# Patient Record
Sex: MALE | Race: WHITE | NOT HISPANIC OR LATINO | URBAN - METROPOLITAN AREA
[De-identification: names, ages, dates, MRNs, and addresses within clinical notes are randomized per-mention and may not be internally consistent; named-entity substitution may affect disease eponyms.]

---

## 2023-05-22 DIAGNOSIS — I10 ESSENTIAL (PRIMARY) HYPERTENSION: ICD-10-CM

## 2023-05-22 RX ORDER — HYDROCHLOROTHIAZIDE 12.5 MG/1
TABLET ORAL
Qty: 90 TABLET | Refills: 2 | Status: SHIPPED | OUTPATIENT
Start: 2023-05-22 | End: 2024-03-07

## 2023-07-21 DIAGNOSIS — N52.9 ERECTILE DYSFUNCTION, UNSPECIFIED ERECTILE DYSFUNCTION TYPE: Primary | ICD-10-CM

## 2023-07-21 RX ORDER — TADALAFIL 10 MG/1
10 TABLET ORAL DAILY PRN
COMMUNITY
Start: 2012-11-27 | End: 2023-07-21 | Stop reason: SDUPTHER

## 2023-07-21 RX ORDER — TADALAFIL 10 MG/1
10 TABLET ORAL DAILY PRN
Qty: 10 TABLET | Refills: 0 | Status: SHIPPED | OUTPATIENT
Start: 2023-07-21 | End: 2023-11-20

## 2023-09-27 DIAGNOSIS — E78.5 HYPERLIPIDEMIA, UNSPECIFIED: ICD-10-CM

## 2023-09-27 RX ORDER — ATORVASTATIN CALCIUM 40 MG/1
40 TABLET, FILM COATED ORAL NIGHTLY
Qty: 90 TABLET | Refills: 2 | Status: SHIPPED | OUTPATIENT
Start: 2023-09-27 | End: 2024-04-24

## 2023-10-13 ENCOUNTER — SPECIALTY PHARMACY (OUTPATIENT)
Dept: PHARMACY | Facility: CLINIC | Age: 68
End: 2023-10-13

## 2023-10-13 DIAGNOSIS — D45 POLYCYTHEMIA VERA (MULTI): Primary | ICD-10-CM

## 2023-10-13 RX ORDER — HYDROXYUREA 500 MG/1
CAPSULE ORAL
Qty: 42 CAPSULE | Refills: 2 | Status: SHIPPED | OUTPATIENT
Start: 2023-10-13 | End: 2024-01-21 | Stop reason: SDUPTHER

## 2023-10-14 ENCOUNTER — PHARMACY VISIT (OUTPATIENT)
Dept: PHARMACY | Facility: CLINIC | Age: 68
End: 2023-10-14
Payer: MEDICARE

## 2023-10-14 PROCEDURE — RXMED WILLOW AMBULATORY MEDICATION CHARGE

## 2023-10-14 NOTE — PROGRESS NOTES
Adriel Solis is a 68 y.o. year old male patient who had a patient care encounter with Gladis Martell PA-C on 7/14/23 for ongoing management of Polycythemia vera (CMS/HCC) [D45].  Adriel is currently being treated with hydroxyurea.    Adriel is active with good appetite.  Labs from 7/14/23:  WBC 9.9, ANC 4.15, H/H 14.8/41.5, PLTs 286;  CMP stable.  Continue current therapy.    Per collaborative practice agreement with Dr. Linda, on 10/13/23  I refilled hydroxyurea 1000 mg (2 x 500 mg) PO once daily on Mon, Weds and Fri and 500 mg PO once daily on the other 4 days a week, 30 day cycle.  Qty # 42 with 2 refills.  The prescription was sent to Dr. Linda for approval, pending subsequent electronic transmission to  [facility].    Next FUV is 11/14/23.    Jacob Blackwell, [facility], MS, BCOP  Clinical Pharmacist Specialist - Ambulatory Oncology

## 2023-11-12 ASSESSMENT — PATIENT HEALTH QUESTIONNAIRE - PHQ9
1. LITTLE INTEREST OR PLEASURE IN DOING THINGS: NOT AT ALL
4. FEELING TIRED OR HAVING LITTLE ENERGY: SEVERAL DAYS
9. THOUGHTS THAT YOU WOULD BE BETTER OFF DEAD, OR OF HURTING YOURSELF: NOT AT ALL
6. FEELING BAD ABOUT YOURSELF - OR THAT YOU ARE A FAILURE OR HAVE LET YOURSELF OR YOUR FAMILY DOWN: NOT AT ALL
5. POOR APPETITE OR OVEREATING: NOT AT ALL
5. POOR APPETITE OR OVEREATING: 0
7. TROUBLE CONCENTRATING ON THINGS, SUCH AS READING THE NEWSPAPER OR WATCHING TELEVISION: 0
4. FEELING TIRED OR HAVING LITTLE ENERGY: 1
8. MOVING OR SPEAKING SO SLOWLY THAT OTHER PEOPLE COULD HAVE NOTICED. OR THE OPPOSITE, BEING SO FIGETY OR RESTLESS THAT YOU HAVE BEEN MOVING AROUND A LOT MORE THAN USUAL: NOT AT ALL
1. LITTLE INTEREST OR PLEASURE IN DOING THINGS: 0
2. FEELING DOWN, DEPRESSED OR HOPELESS: SEVERAL DAYS
5. POOR APPETITE OR OVEREATING: NOT AT ALL
8. MOVING OR SPEAKING SO SLOWLY THAT OTHER PEOPLE COULD HAVE NOTICED. OR THE OPPOSITE, BEING SO FIGETY OR RESTLESS THAT YOU HAVE BEEN MOVING AROUND A LOT MORE THAN USUAL: NOT AT ALL
1. LITTLE INTEREST OR PLEASURE IN DOING THINGS: NOT AT ALL
6. FEELING BAD ABOUT YOURSELF - OR THAT YOU ARE A FAILURE OR HAVE LET YOURSELF OR YOUR FAMILY DOWN: 0
2. FEELING DOWN, DEPRESSED, IRRITABLE, OR HOPELESS: 1
10. IF YOU CHECKED OFF ANY PROBLEMS, HOW DIFFICULT HAVE THESE PROBLEMS MADE IT FOR YOU TO DO YOUR WORK, TAKE CARE OF THINGS AT HOME, OR GET ALONG WITH OTHER PEOPLE: SOMEWHAT DIFFICULT
3. TROUBLE FALLING OR STAYING ASLEEP OR SLEEPING TOO MUCH: SEVERAL DAYS
6. FEELING BAD ABOUT YOURSELF - OR THAT YOU ARE A FAILURE OR HAVE LET YOURSELF OR YOUR FAMILY DOWN: NOT AT ALL
8. MOVING OR SPEAKING SO SLOWLY THAT OTHER PEOPLE COULD HAVE NOTICED. OR THE OPPOSITE, BEING SO FIGETY OR RESTLESS THAT YOU HAVE BEEN MOVING AROUND A LOT MORE THAN USUAL: 0
3. TROUBLE FALLING OR STAYING ASLEEP OR SLEEPING TOO MUCH: 1
SUM OF ALL RESPONSES TO PHQ QUESTIONS 1-9: 3
2. FEELING DOWN, DEPRESSED, IRRITABLE, OR HOPELESS: SEVERAL DAYS
7. TROUBLE CONCENTRATING ON THINGS, SUCH AS READING THE NEWSPAPER OR WATCHING TELEVISION: NOT AT ALL
3. TROUBLE FALLING OR STAYING ASLEEP OR SLEEPING TOO MUCH: SEVERAL DAYS
9. THOUGHTS THAT YOU WOULD BE BETTER OFF DEAD, OR OF HURTING YOURSELF: NOT AT ALL
7. TROUBLE CONCENTRATING ON THINGS, SUCH AS READING THE NEWSPAPER OR WATCHING TELEVISION: NOT AT ALL
4. FEELING TIRED OR HAVING LITTLE ENERGY: SEVERAL DAYS
9. THOUGHTS THAT YOU WOULD BE BETTER OFF DEAD, OR OF HURTING YOURSELF: 0
10. IF YOU CHECKED OFF ANY PROBLEMS, HOW DIFFICULT HAVE THESE PROBLEMS MADE IT FOR YOU TO DO YOUR WORK, TAKE CARE OF THINGS AT HOME, OR GET ALONG WITH OTHER PEOPLE: SOMEWHAT DIFFICULT
SUM OF ALL RESPONSES TO PHQ QUESTIONS 1-9: 3

## 2023-11-14 ENCOUNTER — OFFICE VISIT (OUTPATIENT)
Dept: HEMATOLOGY/ONCOLOGY | Facility: CLINIC | Age: 68
End: 2023-11-14
Payer: COMMERCIAL

## 2023-11-14 ENCOUNTER — LAB (OUTPATIENT)
Dept: LAB | Facility: CLINIC | Age: 68
End: 2023-11-14
Payer: COMMERCIAL

## 2023-11-14 VITALS
WEIGHT: 209.66 LBS | SYSTOLIC BLOOD PRESSURE: 123 MMHG | TEMPERATURE: 97 F | BODY MASS INDEX: 30.01 KG/M2 | RESPIRATION RATE: 16 BRPM | DIASTOLIC BLOOD PRESSURE: 79 MMHG | OXYGEN SATURATION: 95 % | HEART RATE: 87 BPM

## 2023-11-14 DIAGNOSIS — D45 POLYCYTHEMIA VERA (MULTI): ICD-10-CM

## 2023-11-14 DIAGNOSIS — D47.3 ESSENTIAL THROMBOCYTHEMIA (MULTI): Primary | ICD-10-CM

## 2023-11-14 LAB
ALBUMIN SERPL BCP-MCNC: 4.3 G/DL (ref 3.4–5)
ALP SERPL-CCNC: 77 U/L (ref 33–136)
ALT SERPL W P-5'-P-CCNC: 25 U/L (ref 10–52)
ANION GAP SERPL CALC-SCNC: 15 MMOL/L (ref 10–20)
AST SERPL W P-5'-P-CCNC: 19 U/L (ref 9–39)
BASOPHILS # BLD AUTO: 0.04 X10*3/UL (ref 0–0.1)
BASOPHILS NFR BLD AUTO: 0.4 %
BILIRUB SERPL-MCNC: 0.6 MG/DL (ref 0–1.2)
BUN SERPL-MCNC: 29 MG/DL (ref 6–23)
CALCIUM SERPL-MCNC: 9.8 MG/DL (ref 8.6–10.3)
CHLORIDE SERPL-SCNC: 103 MMOL/L (ref 98–107)
CO2 SERPL-SCNC: 27 MMOL/L (ref 21–32)
CREAT SERPL-MCNC: 1.12 MG/DL (ref 0.5–1.3)
EOSINOPHIL # BLD AUTO: 0.06 X10*3/UL (ref 0–0.7)
EOSINOPHIL NFR BLD AUTO: 0.5 %
ERYTHROCYTE [DISTWIDTH] IN BLOOD BY AUTOMATED COUNT: 13.6 % (ref 11.5–14.5)
GFR SERPL CREATININE-BSD FRML MDRD: 71 ML/MIN/1.73M*2
GLUCOSE SERPL-MCNC: 109 MG/DL (ref 74–99)
HCT VFR BLD AUTO: 43.8 % (ref 41–52)
HGB BLD-MCNC: 15.2 G/DL (ref 13.5–17.5)
HOLD SPECIMEN: NORMAL
IMM GRANULOCYTES # BLD AUTO: 0.04 X10*3/UL (ref 0–0.7)
IMM GRANULOCYTES NFR BLD AUTO: 0.4 % (ref 0–0.9)
LYMPHOCYTES # BLD AUTO: 4.75 X10*3/UL (ref 1.2–4.8)
LYMPHOCYTES NFR BLD AUTO: 43.1 %
MCH RBC QN AUTO: 36.5 PG (ref 26–34)
MCHC RBC AUTO-ENTMCNC: 34.7 G/DL (ref 32–36)
MCV RBC AUTO: 105 FL (ref 80–100)
MONOCYTES # BLD AUTO: 0.61 X10*3/UL (ref 0.1–1)
MONOCYTES NFR BLD AUTO: 5.5 %
NEUTROPHILS # BLD AUTO: 5.51 X10*3/UL (ref 1.2–7.7)
NEUTROPHILS NFR BLD AUTO: 50.1 %
PLATELET # BLD AUTO: 336 X10*3/UL (ref 150–450)
POTASSIUM SERPL-SCNC: 3.8 MMOL/L (ref 3.5–5.3)
PROT SERPL-MCNC: 6.7 G/DL (ref 6.4–8.2)
RBC # BLD AUTO: 4.16 X10*6/UL (ref 4.5–5.9)
SODIUM SERPL-SCNC: 141 MMOL/L (ref 136–145)
WBC # BLD AUTO: 11 X10*3/UL (ref 4.4–11.3)

## 2023-11-14 PROCEDURE — 99214 OFFICE O/P EST MOD 30 MIN: CPT | Performed by: PHYSICIAN ASSISTANT

## 2023-11-14 PROCEDURE — 36415 COLL VENOUS BLD VENIPUNCTURE: CPT

## 2023-11-14 PROCEDURE — 85025 COMPLETE CBC W/AUTO DIFF WBC: CPT

## 2023-11-14 PROCEDURE — 80053 COMPREHEN METABOLIC PANEL: CPT

## 2023-11-14 PROCEDURE — 1159F MED LIST DOCD IN RCRD: CPT | Performed by: PHYSICIAN ASSISTANT

## 2023-11-14 PROCEDURE — 1126F AMNT PAIN NOTED NONE PRSNT: CPT | Performed by: PHYSICIAN ASSISTANT

## 2023-11-14 ASSESSMENT — ENCOUNTER SYMPTOMS
DIFFICULTY URINATING: 0
DIARRHEA: 0
DIZZINESS: 0
WOUND: 0
ABDOMINAL DISTENTION: 0
DECREASED CONCENTRATION: 0
CARDIOVASCULAR NEGATIVE: 1
HEMATOLOGIC/LYMPHATIC NEGATIVE: 1
CONSTIPATION: 0
APPETITE CHANGE: 0
DIAPHORESIS: 0
ABDOMINAL PAIN: 0
VOMITING: 0
HEMATURIA: 0
FREQUENCY: 0
HEADACHES: 0
FATIGUE: 1
EYE PROBLEMS: 0
SCLERAL ICTERUS: 0
NAUSEA: 0
CHILLS: 0
NECK STIFFNESS: 0
SPEECH DIFFICULTY: 0
RESPIRATORY NEGATIVE: 1
NECK PAIN: 0
NUMBNESS: 0
CONFUSION: 0

## 2023-11-14 ASSESSMENT — PAIN SCALES - GENERAL: PAINLEVEL: 0-NO PAIN

## 2023-11-14 NOTE — PROGRESS NOTES
Patient ID: Adriel Solis is a 68 y.o. male.  Primary care physician: Dr. Wilder Jones    Interval History:   Referred for Erythrocytosis. Mr. Solis is here after he was incidentally found to have a high blood counts (platelets and white blood cells). He is a very active man, quit smoking 30 years ago and was never a heavy smoker. 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 Although this was never proven, he was told that he may have had a blood clot.    Workup:  Lab parameters showed increased platelet count and slightly elevated white count. GERTRUDE-2 positive in peripheral blood. Diagnosed with Essential thrombocytosis.     Follow up  Started Hydrea 500mg by mouth daily in August 2020. Increased to Hydrea alternating 1000mg with 500mg in Nov 2020.    -Currently,  on Hydrea 1000 mg 3 days per week (Monday, Wednesday and Friday) and 500 mg for the rest 4 days.     Subjective    -Complains of fatigue in the morning.   -Denies any other new health concerns.   -Continues on hydrea 1000 mg 3 days per week and 500 mg for the rest 4 days. States that he tolerates hydrea well.     Objective    BMI:   Body mass index is 30.01 kg/m².     Vitals:   Visit Vitals  /79   Pulse 87   Temp 36.1 °C (97 °F)   Resp 16   Wt 95.1 kg (209 lb 10.5 oz)   SpO2 95%   BMI 30.01 kg/m²   BSA 2.17 m²        Review of Systems   Constitutional:  Positive for fatigue. Negative for appetite change, chills and diaphoresis.   HENT:   Negative for lump/mass, mouth sores and nosebleeds.    Eyes:  Negative for eye problems and icterus.   Respiratory: Negative.     Cardiovascular: Negative.    Gastrointestinal:  Negative for abdominal distention, abdominal pain, constipation, diarrhea, nausea and vomiting.   Genitourinary:  Negative for bladder incontinence, difficulty urinating, frequency and hematuria.    Musculoskeletal:  Negative for gait problem, neck pain and neck stiffness.    Skin:  Negative for itching, rash and wound.   Neurological:  Negative for dizziness, gait problem, headaches, numbness and speech difficulty.   Hematological: Negative.    Psychiatric/Behavioral:  Negative for confusion and decreased concentration.         Physical Exam  Constitutional:       General: He is not in acute distress.     Appearance: Normal appearance.   HENT:      Head: Normocephalic and atraumatic.      Nose: Nose normal.      Mouth/Throat:      Mouth: Mucous membranes are moist.      Pharynx: Oropharynx is clear. No oropharyngeal exudate or posterior oropharyngeal erythema.   Eyes:      General: No scleral icterus.     Extraocular Movements: Extraocular movements intact.      Conjunctiva/sclera: Conjunctivae normal.   Cardiovascular:      Rate and Rhythm: Normal rate and regular rhythm.   Pulmonary:      Effort: Pulmonary effort is normal. No respiratory distress.      Breath sounds: Normal breath sounds. No wheezing.   Abdominal:      General: There is no distension.   Musculoskeletal:         General: No swelling, deformity or signs of injury. Normal range of motion.      Cervical back: Normal range of motion.   Skin:     General: Skin is warm and dry.      Coloration: Skin is not jaundiced.      Findings: No bruising, lesion or rash.   Neurological:      General: No focal deficit present.      Mental Status: He is alert and oriented to person, place, and time. Mental status is at baseline.         Labs:  Lab Results   Component Value Date    WBC 9.9 07/14/2023    NEUTROABS 4.15 07/14/2023    LYMPHSABS 4.92 (H) 07/14/2023    MONOSABS 0.66 07/14/2023    EOSABS 0.13 07/14/2023    BASOSABS 0.05 07/14/2023    RBC 4.04 (L) 07/14/2023     (H) 07/14/2023    MCHC 35.7 07/14/2023    HGB 14.8 07/14/2023    HCT 41.5 07/14/2023     07/14/2023     Lab Results   Component Value Date    RETICCTPCT 1.6 07/14/2022      Lab Results   Component Value Date    CREATININE 1.22 07/14/2023    BUN 28 (H)  "07/14/2023     07/14/2023    K 3.7 07/14/2023     07/14/2023    CO2 29 07/14/2023      Lab Results   Component Value Date    ALT 18 07/14/2023    AST 18 07/14/2023    ALKPHOS 71 07/14/2023    BILITOT 0.6 07/14/2023      Lab Results   Component Value Date    CRP 8.95 (A) 11/22/2022      No results found for: \"JON\"  Lab Results   Component Value Date     11/17/2022          Performance Status:  Symptomatic; fully ambulatory    Assessment/Plan      66 y/o man referred for incidental finding of high platelet count. GERTRUDE-2 positive. Started on Hydrea 500mg by mouth daily in August 2020. Increased to 2-1-2-1 schedule on Nov 11, 2020. Tolerating this regimen well.     1) GERTRUDE-2 mutation   -Currently, he is on Hydrea 1000 mg, p.o. 3 days per week and 500 mg, p.o. 4 days per week.   -He tolerates treatment well.   -Labs done today: WBC 11.0, ANC 5.51, Hgb 15.2, , platelets 336,000, creatinine 1.12  -WBC, ANC, Hgb, platelets counts are within the normal range.   -Advised patient to continue current regimen of hydrea without changes.   -I will see him back in 4 months to follow up with labs done a few days prior to the next vsiit.       2) Anxiety   - Taking Lexapro 5mg daily.   - Follows with Dr. Jones, his PCP    Problem List Items Addressed This Visit    None  Visit Diagnoses         Codes    Essential thrombocythemia (CMS/HCC)    -  Primary D47.3    Relevant Orders    Clinic Appointment Request Follow Up; REBEL ALVES; [facility]    CBC and Auto Differential    Comprehensive Metabolic Panel                 Rebel Alves PA-C          "

## 2023-11-14 NOTE — PATIENT INSTRUCTIONS
You will continue the current regimen of Hydrea without changes.     I will see you back in 4 months to follow up.

## 2023-11-15 ENCOUNTER — PHARMACY VISIT (OUTPATIENT)
Dept: PHARMACY | Facility: CLINIC | Age: 68
End: 2023-11-15
Payer: MEDICARE

## 2023-11-15 PROCEDURE — RXMED WILLOW AMBULATORY MEDICATION CHARGE

## 2023-11-17 ENCOUNTER — SPECIALTY PHARMACY (OUTPATIENT)
Dept: PHARMACY | Facility: CLINIC | Age: 68
End: 2023-11-17

## 2023-11-19 DIAGNOSIS — N52.9 ERECTILE DYSFUNCTION, UNSPECIFIED ERECTILE DYSFUNCTION TYPE: ICD-10-CM

## 2023-11-19 DIAGNOSIS — M19.90 ARTHRITIS: Primary | ICD-10-CM

## 2023-11-19 DIAGNOSIS — I10 ESSENTIAL (PRIMARY) HYPERTENSION: ICD-10-CM

## 2023-11-20 RX ORDER — MELOXICAM 15 MG/1
15 TABLET ORAL DAILY PRN
Qty: 30 TABLET | Refills: 3 | Status: SHIPPED | OUTPATIENT
Start: 2023-11-20 | End: 2024-03-18

## 2023-11-20 RX ORDER — LOSARTAN POTASSIUM 50 MG/1
50 TABLET ORAL DAILY
Qty: 90 TABLET | Refills: 2 | Status: SHIPPED | OUTPATIENT
Start: 2023-11-20 | End: 2024-06-05

## 2023-11-20 RX ORDER — TADALAFIL 10 MG/1
10 TABLET ORAL DAILY PRN
Qty: 6 TABLET | Refills: 1 | Status: SHIPPED | OUTPATIENT
Start: 2023-11-20

## 2023-12-19 ENCOUNTER — SPECIALTY PHARMACY (OUTPATIENT)
Dept: PHARMACY | Facility: CLINIC | Age: 68
End: 2023-12-19

## 2023-12-19 PROCEDURE — RXMED WILLOW AMBULATORY MEDICATION CHARGE

## 2023-12-20 ENCOUNTER — PHARMACY VISIT (OUTPATIENT)
Dept: PHARMACY | Facility: CLINIC | Age: 68
End: 2023-12-20
Payer: MEDICARE

## 2024-01-16 DIAGNOSIS — D45 POLYCYTHEMIA VERA (MULTI): ICD-10-CM

## 2024-01-16 RX ORDER — HYDROXYUREA 500 MG/1
CAPSULE ORAL
Qty: 42 CAPSULE | Refills: 2 | Status: CANCELLED | OUTPATIENT
Start: 2024-01-16 | End: 2025-01-15

## 2024-01-21 DIAGNOSIS — D45 POLYCYTHEMIA VERA (MULTI): ICD-10-CM

## 2024-01-21 RX ORDER — HYDROXYUREA 500 MG/1
CAPSULE ORAL
Qty: 42 CAPSULE | Refills: 3 | Status: SHIPPED | OUTPATIENT
Start: 2024-01-21 | End: 2024-04-19 | Stop reason: SDUPTHER

## 2024-01-21 NOTE — PROGRESS NOTES
Adriel Solis is a 69 y.o. year old male patient who had a patient care encounter with Gladis Martell PA-C on 11/14/23for ongoing management of his PV.  Adriel is currently being treated with hydroxyurea.    Tolerating HU well.  Labs from 11/14/23:  HCT 43.8, PLTs 336, WBC 11.0, ANC 5.51, Hgb 15.2; CMP relatively stable (BUN at 29).  Continue current therapy.    Per collaborative practice agreement with Dr. Linda, on 1/21/24 per home medication protocol, I refilled hydroxyurea 1000 mg (2 x 500 mg) PO once daily on M, W, F (3 days/week) and 500 mg PO once daily on Sun, T, Th, Sat (4 days/week), 30-day continuous cycle.  Qty # 42 with 3 refills.  The prescription was sent to Dr. Linda for approval, pending subsequent electronic transmission to  [facility]ty Pharm[facility].    Next FUV is 3/14/24.      Jacob Blackwell, [facility], MS, BCOP  Clinical Pharmacist Specialist - Ambulatory Oncology

## 2024-01-22 PROCEDURE — RXMED WILLOW AMBULATORY MEDICATION CHARGE

## 2024-01-24 ENCOUNTER — PHARMACY VISIT (OUTPATIENT)
Dept: PHARMACY | Facility: CLINIC | Age: 69
End: 2024-01-24
Payer: MEDICARE

## 2024-02-21 ENCOUNTER — SPECIALTY PHARMACY (OUTPATIENT)
Dept: PHARMACY | Facility: CLINIC | Age: 69
End: 2024-02-21

## 2024-02-21 ENCOUNTER — PHARMACY VISIT (OUTPATIENT)
Dept: PHARMACY | Facility: CLINIC | Age: 69
End: 2024-02-21
Payer: MEDICARE

## 2024-02-21 PROCEDURE — RXMED WILLOW AMBULATORY MEDICATION CHARGE

## 2024-03-07 DIAGNOSIS — I10 ESSENTIAL (PRIMARY) HYPERTENSION: ICD-10-CM

## 2024-03-07 RX ORDER — HYDROCHLOROTHIAZIDE 12.5 MG/1
TABLET ORAL
Qty: 90 TABLET | Refills: 2 | Status: SHIPPED | OUTPATIENT
Start: 2024-03-07

## 2024-03-11 ASSESSMENT — ENCOUNTER SYMPTOMS
NUMBNESS: 0
FATIGUE: 1
DECREASED CONCENTRATION: 0
EYE PROBLEMS: 0
HEADACHES: 0
NAUSEA: 0
CARDIOVASCULAR NEGATIVE: 1
APPETITE CHANGE: 0
SPEECH DIFFICULTY: 0
DIZZINESS: 0
ABDOMINAL DISTENTION: 0
HEMATURIA: 0
WOUND: 0
RESPIRATORY NEGATIVE: 1
VOMITING: 0
ABDOMINAL PAIN: 0
NECK STIFFNESS: 0
DIAPHORESIS: 0
NECK PAIN: 0
CONSTIPATION: 0
DIARRHEA: 0
SCLERAL ICTERUS: 0
DIFFICULTY URINATING: 0
CHILLS: 0
HEMATOLOGIC/LYMPHATIC NEGATIVE: 1
FREQUENCY: 0
CONFUSION: 0

## 2024-03-11 NOTE — PROGRESS NOTES
Patient ID: Adriel Solis is a 69 y.o. male.  Primary care physician: Dr. Wilder Jones    PMx of Essential thrombocythemia, HTN, DLD, Depression     Interval History:   Referred for Erythrocytosis. Mr. Solis is here after he was incidentally found to have a high blood counts (platelets and white blood cells). He is a very active man, quit smoking 30 years ago and was never a heavy smoker. 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 Although this was never proven, he was told that he may have had a blood clot.    Workup:  Lab parameters showed increased platelet count and slightly elevated white count. GERTRUDE-2 positive in peripheral blood. Diagnosed with Essential thrombocytosis.     Follow up  Started Hydrea 500mg by mouth daily in August 2020. Increased to Hydrea alternating 1000mg with 500mg in Nov 2020.    -Currently,  on Hydrea 1000 mg 3 days per week (Monday, Wednesday and Friday) and 500 mg for the rest 4 days.     Subjective    11/14/2023:   -Complains of fatigue in the morning.   -Denies any other new health concerns.   -Continues on hydrea 1000 mg 3 days per week and 500 mg for the rest 4 days. States that he tolerates hydrea well.     3/14/2024:   Patient's previous provider relocated so I am taking over his care, this is our first encounter together. He reports doing well overall with no new symptoms or changes, denies concerns. He does endorse increased stress related to his divorce, which was just finalized. He continues to take his Hydrea 1000 mg 3 days per week and 500 mg for the other days and tolerates well. Stays active by walking 4 miles per day.     Patient denies signs of bleeding, drenching night sweats, GI issues, new lumps/bumps, pain, chest palpitations, SOB, unintentional weight loss, or other complaints.       Objective      Vitals:   Visit Vitals  /86   Pulse 105   Temp 36.7 °C (98.1 °F)   Resp 16   Wt 98.1 kg (216  lb 4.3 oz)   SpO2 99%   BMI 30.96 kg/m²   BSA 2.2 m²           Review of Systems   Constitutional:  Positive for fatigue. Negative for appetite change, chills and diaphoresis.   HENT:   Negative for lump/mass, mouth sores and nosebleeds.    Eyes:  Negative for eye problems and icterus.   Respiratory: Negative.     Cardiovascular: Negative.    Gastrointestinal:  Negative for abdominal distention, abdominal pain, constipation, diarrhea, nausea and vomiting.   Genitourinary:  Negative for bladder incontinence, difficulty urinating, frequency and hematuria.    Musculoskeletal:  Negative for gait problem, neck pain and neck stiffness.   Skin:  Negative for itching, rash and wound.   Neurological:  Negative for dizziness, gait problem, headaches, numbness and speech difficulty.   Hematological: Negative.    Psychiatric/Behavioral:  Negative for confusion and decreased concentration.         Physical Exam  Constitutional:       General: He is not in acute distress.     Appearance: Normal appearance.   HENT:      Head: Normocephalic and atraumatic.      Nose: Nose normal.      Mouth/Throat:      Mouth: Mucous membranes are moist.      Pharynx: Oropharynx is clear. No oropharyngeal exudate or posterior oropharyngeal erythema.   Eyes:      General: No scleral icterus.     Extraocular Movements: Extraocular movements intact.      Conjunctiva/sclera: Conjunctivae normal.   Cardiovascular:      Rate and Rhythm: Normal rate and regular rhythm.   Pulmonary:      Effort: Pulmonary effort is normal. No respiratory distress.      Breath sounds: Normal breath sounds. No wheezing.   Abdominal:      General: There is no distension.   Musculoskeletal:         General: No swelling, deformity or signs of injury. Normal range of motion.      Cervical back: Normal range of motion.   Skin:     General: Skin is warm and dry.      Coloration: Skin is not jaundiced.      Findings: No bruising, lesion or rash.   Neurological:      General: No  focal deficit present.      Mental Status: He is alert and oriented to person, place, and time. Mental status is at baseline.       Labs:  Lab Results   Component Value Date    WBC 10.0 03/14/2024    NEUTROABS 4.30 03/14/2024    IGABSOL 0.03 03/14/2024    LYMPHSABS 4.91 (H) 03/14/2024    MONOSABS 0.59 03/14/2024    EOSABS 0.16 03/14/2024    BASOSABS 0.04 03/14/2024    RBC 4.27 (L) 03/14/2024     (H) 03/14/2024    MCHC 34.7 03/14/2024    HGB 15.4 03/14/2024    HCT 44.4 03/14/2024     03/14/2024     Lab Results   Component Value Date    CREATININE 1.09 03/14/2024    BUN 22 03/14/2024    EGFR 73 03/14/2024     03/14/2024    K 4.1 03/14/2024     03/14/2024    CO2 28 03/14/2024      Lab Results   Component Value Date    ALT 28 03/14/2024    AST 23 03/14/2024    ALKPHOS 66 03/14/2024    BILITOT 0.6 03/14/2024           Performance Status:  Symptomatic; fully ambulatory    Assessment/Plan      68 y/o man referred for incidental finding of high platelet count. GERTRUDE-2 positive. Started on Hydrea 500mg by mouth daily in August 2020. Increased to 2-1-2-1 schedule on Nov 11, 2020. Tolerating this regimen well.     1) GERTRUDE-2 mutation   -Currently, he is on Hydrea 1000 mg, p.o. 3 days per week and 500 mg, p.o. 4 days per week.   -He tolerates treatment well.   -Labs done today: WBC 11.0, ANC 5.51, Hgb 15.2, , platelets 336,000, creatinine 1.12  -WBC, ANC, Hgb, platelets counts are within the normal range.   -Advised patient to continue current regimen of hydrea without changes.   -I will see him back in 4 months to follow up with labs done a few days prior to the next vsiit.     3/14/2024:   - Presents for follow up  -Currently, he is on Hydrea 1000 mg, p.o. 3 days per week and 500 mg, p.o. 4 days per week  - Labs reviewed: WBC 10.0, hgb 15.4, plt 324,000  - He reports doing well with no concerns or complaints   - He was advised to continue his Hydrea at current dosage  - He will work with  Jacob/Mavis from our pharmacy team as he is retiring soon and will have medication changes   - He will RTC in 4 months for follow up with labs       2) Anxiety   - Taking Lexapro 5mg daily.   - Follows with Dr. Jones, his PCP      Diagnoses and all orders for this visit:  Essential thrombocythemia (CMS/HCC)  -     Clinic Appointment Request Follow Up; REBEL ALVES; [facility]  -     Clinic Appointment Request JOON MARQUEZ; Future  -     CBC and Auto Differential; Future  -     Comprehensive metabolic panel; Future           MIKE Oconnor-CNP

## 2024-03-14 ENCOUNTER — LAB (OUTPATIENT)
Dept: LAB | Facility: CLINIC | Age: 69
End: 2024-03-14
Payer: COMMERCIAL

## 2024-03-14 ENCOUNTER — OFFICE VISIT (OUTPATIENT)
Dept: HEMATOLOGY/ONCOLOGY | Facility: CLINIC | Age: 69
End: 2024-03-14
Payer: COMMERCIAL

## 2024-03-14 ENCOUNTER — APPOINTMENT (OUTPATIENT)
Dept: HEMATOLOGY/ONCOLOGY | Facility: CLINIC | Age: 69
End: 2024-03-14
Payer: COMMERCIAL

## 2024-03-14 VITALS
WEIGHT: 216.27 LBS | RESPIRATION RATE: 16 BRPM | TEMPERATURE: 98.1 F | HEART RATE: 105 BPM | DIASTOLIC BLOOD PRESSURE: 86 MMHG | SYSTOLIC BLOOD PRESSURE: 135 MMHG | OXYGEN SATURATION: 99 % | BODY MASS INDEX: 30.96 KG/M2

## 2024-03-14 DIAGNOSIS — D47.3 ESSENTIAL THROMBOCYTHEMIA (MULTI): ICD-10-CM

## 2024-03-14 LAB
ALBUMIN SERPL BCP-MCNC: 4.4 G/DL (ref 3.4–5)
ALP SERPL-CCNC: 66 U/L (ref 33–136)
ALT SERPL W P-5'-P-CCNC: 28 U/L (ref 10–52)
ANION GAP SERPL CALC-SCNC: 12 MMOL/L (ref 10–20)
AST SERPL W P-5'-P-CCNC: 23 U/L (ref 9–39)
BASOPHILS # BLD AUTO: 0.04 X10*3/UL (ref 0–0.1)
BASOPHILS NFR BLD AUTO: 0.4 %
BILIRUB SERPL-MCNC: 0.6 MG/DL (ref 0–1.2)
BUN SERPL-MCNC: 22 MG/DL (ref 6–23)
CALCIUM SERPL-MCNC: 9.4 MG/DL (ref 8.6–10.3)
CHLORIDE SERPL-SCNC: 103 MMOL/L (ref 98–107)
CO2 SERPL-SCNC: 28 MMOL/L (ref 21–32)
CREAT SERPL-MCNC: 1.09 MG/DL (ref 0.5–1.3)
EGFRCR SERPLBLD CKD-EPI 2021: 73 ML/MIN/1.73M*2
EOSINOPHIL # BLD AUTO: 0.16 X10*3/UL (ref 0–0.4)
EOSINOPHIL NFR BLD AUTO: 1.6 %
ERYTHROCYTE [DISTWIDTH] IN BLOOD BY AUTOMATED COUNT: 13.5 % (ref 11.5–14.5)
GLUCOSE SERPL-MCNC: 120 MG/DL (ref 74–99)
HCT VFR BLD AUTO: 44.4 % (ref 41–52)
HGB BLD-MCNC: 15.4 G/DL (ref 13.5–17.5)
IMM GRANULOCYTES # BLD AUTO: 0.03 X10*3/UL (ref 0–0.5)
IMM GRANULOCYTES NFR BLD AUTO: 0.3 % (ref 0–0.9)
LYMPHOCYTES # BLD AUTO: 4.91 X10*3/UL (ref 0.8–3)
LYMPHOCYTES NFR BLD AUTO: 49 %
MCH RBC QN AUTO: 36.1 PG (ref 26–34)
MCHC RBC AUTO-ENTMCNC: 34.7 G/DL (ref 32–36)
MCV RBC AUTO: 104 FL (ref 80–100)
MONOCYTES # BLD AUTO: 0.59 X10*3/UL (ref 0.05–0.8)
MONOCYTES NFR BLD AUTO: 5.9 %
NEUTROPHILS # BLD AUTO: 4.3 X10*3/UL (ref 1.6–5.5)
NEUTROPHILS NFR BLD AUTO: 42.8 %
PLATELET # BLD AUTO: 324 X10*3/UL (ref 150–450)
POTASSIUM SERPL-SCNC: 4.1 MMOL/L (ref 3.5–5.3)
PROT SERPL-MCNC: 6.9 G/DL (ref 6.4–8.2)
RBC # BLD AUTO: 4.27 X10*6/UL (ref 4.5–5.9)
SODIUM SERPL-SCNC: 139 MMOL/L (ref 136–145)
WBC # BLD AUTO: 10 X10*3/UL (ref 4.4–11.3)

## 2024-03-14 PROCEDURE — 1126F AMNT PAIN NOTED NONE PRSNT: CPT

## 2024-03-14 PROCEDURE — 85025 COMPLETE CBC W/AUTO DIFF WBC: CPT

## 2024-03-14 PROCEDURE — 80053 COMPREHEN METABOLIC PANEL: CPT

## 2024-03-14 PROCEDURE — 36415 COLL VENOUS BLD VENIPUNCTURE: CPT

## 2024-03-14 PROCEDURE — 99214 OFFICE O/P EST MOD 30 MIN: CPT

## 2024-03-14 PROCEDURE — 1159F MED LIST DOCD IN RCRD: CPT

## 2024-03-14 ASSESSMENT — PAIN SCALES - GENERAL: PAINLEVEL: 0-NO PAIN

## 2024-03-15 PROCEDURE — RXMED WILLOW AMBULATORY MEDICATION CHARGE

## 2024-03-16 DIAGNOSIS — M19.90 ARTHRITIS: ICD-10-CM

## 2024-03-18 RX ORDER — MELOXICAM 15 MG/1
15 TABLET ORAL DAILY PRN
Qty: 30 TABLET | Refills: 3 | Status: SHIPPED | OUTPATIENT
Start: 2024-03-18

## 2024-03-21 ENCOUNTER — SPECIALTY PHARMACY (OUTPATIENT)
Dept: PHARMACY | Facility: CLINIC | Age: 69
End: 2024-03-21

## 2024-03-22 ENCOUNTER — PHARMACY VISIT (OUTPATIENT)
Dept: PHARMACY | Facility: CLINIC | Age: 69
End: 2024-03-22
Payer: MEDICARE

## 2024-04-19 DIAGNOSIS — D45 POLYCYTHEMIA VERA (MULTI): ICD-10-CM

## 2024-04-19 RX ORDER — HYDROXYUREA 500 MG/1
CAPSULE ORAL
Qty: 42 CAPSULE | Refills: 4 | Status: SHIPPED | OUTPATIENT
Start: 2024-04-19 | End: 2025-04-19

## 2024-04-19 NOTE — PROGRESS NOTES
Adriel Solis is a 69 y.o. year old male patient who had a patient care encounter with Neeraj Donahue CNP on 3/14/24 for ongoing management of his PV.  Adriel is currently being treated with hydroxyurea.    WBC 10.0, ANC 4.30, H/H 15.4/44.4, PLTs 324;  CMP stable, SCr 1.09.  Continue current therapy.    On 4/19/24, Adriel contacted me regarding a recent change of insurance, and as a result, requested that a new prescription for hydroxyurea be sent to his local [id number].    Per collaborative practice agreement with Dr. Linda, on 4/19/24  I refilled hydroxyurea 1000 mg (2 x 500 mg) PO once daily on Mon, Weds, Fri and 500 mg PO once daily on Sun, Tues, Thurs, Sat of each week, 30-day continuous cycle.  Qty # 42 with 4 refills.  The prescription was sent to [id number] on [address] in [facility].    Next FUV is 7/15/24.      Jacob Blackwell RPh, MS, BCOP  Clinical Pharmacist Specialist - Ambulatory Oncology

## 2024-04-24 DIAGNOSIS — E78.5 HYPERLIPIDEMIA, UNSPECIFIED: ICD-10-CM

## 2024-04-24 RX ORDER — ATORVASTATIN CALCIUM 40 MG/1
40 TABLET, FILM COATED ORAL NIGHTLY
Qty: 90 TABLET | Refills: 2 | Status: SHIPPED | OUTPATIENT
Start: 2024-04-24

## 2024-06-05 DIAGNOSIS — I10 ESSENTIAL (PRIMARY) HYPERTENSION: ICD-10-CM

## 2024-06-05 RX ORDER — LOSARTAN POTASSIUM 50 MG/1
50 TABLET ORAL DAILY
Qty: 90 TABLET | Refills: 2 | Status: SHIPPED | OUTPATIENT
Start: 2024-06-05

## 2024-07-11 ASSESSMENT — ENCOUNTER SYMPTOMS
CONFUSION: 0
EYE PROBLEMS: 0
WOUND: 0
DECREASED CONCENTRATION: 0
DIARRHEA: 0
CHILLS: 0
HEMATOLOGIC/LYMPHATIC NEGATIVE: 1
DIFFICULTY URINATING: 0
NUMBNESS: 0
HEMATURIA: 0
ABDOMINAL DISTENTION: 0
VOMITING: 0
SCLERAL ICTERUS: 0
RESPIRATORY NEGATIVE: 1
FREQUENCY: 0
NECK PAIN: 0
DIAPHORESIS: 0
SPEECH DIFFICULTY: 0
HEADACHES: 0
ABDOMINAL PAIN: 0
CONSTIPATION: 0
NAUSEA: 0
FATIGUE: 1
NECK STIFFNESS: 0
APPETITE CHANGE: 0
CARDIOVASCULAR NEGATIVE: 1
DIZZINESS: 0

## 2024-07-11 NOTE — PROGRESS NOTES
Patient ID: Adriel Solis is a 69 y.o. male.  Primary care physician: Dr. Wilder Jones    PMx of Essential thrombocythemia, HTN, DLD, Depression     Interval History:   Referred for Erythrocytosis. Mr. Solis is here after he was incidentally found to have a high blood counts (platelets and white blood cells). He is a very active man, quit smoking 30 years ago and was never a heavy smoker. 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 Although this was never proven, he was told that he may have had a blood clot.    Workup:  Lab parameters showed increased platelet count and slightly elevated white count. GERTRUDE-2 positive in peripheral blood. Diagnosed with Essential thrombocytosis.     Follow up  Started Hydrea 500mg by mouth daily in August 2020. Increased to Hydrea alternating 1000mg with 500mg in Nov 2020.    -Currently, on Hydrea 1000 mg 3 days per week (Monday, Wednesday and Friday) and 500 mg for the rest 4 days.     Subjective    11/14/2023:   -Complains of fatigue in the morning.   -Denies any other new health concerns.   -Continues on hydrea 1000 mg 3 days per week and 500 mg for the rest 4 days. States that he tolerates hydrea well.     3/14/2024:   Patient's previous provider relocated so I am taking over his care, this is our first encounter together. He reports doing well overall with no new symptoms or changes, denies concerns. He does endorse increased stress related to his divorce, which was just finalized. He continues to take his Hydrea 1000 mg 3 days per week and 500 mg for the other days and tolerates well. Stays active by walking 4 miles per day.     Patient denies signs of bleeding, drenching night sweats, GI issues, new lumps/bumps, pain, chest palpitations, SOB, unintentional weight loss, or other complaints.     7/15/2024:   He says still some stress with his dirvorce. He does note faitgue, may need a nap when after doing some  activities like yard work. Does have trouble falling asleep, takes melatonin but still can take an hour to fall alseep. Otherwise he is doing well. Says about a week ago he had an upset stomach, no vomiting, did have loose stools often, symptoms are now resolved. Says he should drink more water in.     Says the fatigue is almost daily. HARDING can be with almost any activity, his son notices it. No B symptoms. [facility], attempts to walk 10,000 steps per day.       Objective    Visit Vitals  /84   Pulse 87   Temp 36 °C (96.8 °F)   Resp 17   Wt 100 kg (220 lb 10.9 oz)   SpO2 95%   BMI 31.59 kg/m²   Smoking Status Former   BSA 2.22 m²             Review of Systems   Constitutional:  Positive for fatigue. Negative for appetite change, chills and diaphoresis.   HENT:   Negative for lump/mass, mouth sores and nosebleeds.    Eyes:  Negative for eye problems and icterus.   Respiratory: Negative.     Cardiovascular: Negative.    Gastrointestinal:  Negative for abdominal distention, abdominal pain, constipation, diarrhea, nausea and vomiting.   Genitourinary:  Negative for bladder incontinence, difficulty urinating, frequency and hematuria.    Musculoskeletal:  Negative for gait problem, neck pain and neck stiffness.   Skin:  Negative for itching, rash and wound.   Neurological:  Negative for dizziness, gait problem, headaches, numbness and speech difficulty.   Hematological: Negative.    Psychiatric/Behavioral:  Negative for confusion and decreased concentration.         Physical Exam  Constitutional:       General: He is not in acute distress.     Appearance: Normal appearance.   HENT:      Head: Normocephalic and atraumatic.      Nose: Nose normal.      Mouth/Throat:      Mouth: Mucous membranes are moist.      Pharynx: Oropharynx is clear. No oropharyngeal exudate or posterior oropharyngeal erythema.   Eyes:      General: No scleral icterus.     Extraocular  Movements: Extraocular movements intact.      Conjunctiva/sclera: Conjunctivae normal.   Cardiovascular:      Rate and Rhythm: Normal rate and regular rhythm.   Pulmonary:      Effort: Pulmonary effort is normal. No respiratory distress.      Breath sounds: Normal breath sounds. No wheezing.   Abdominal:      General: There is no distension.   Musculoskeletal:         General: No swelling, deformity or signs of injury. Normal range of motion.      Cervical back: Normal range of motion.   Skin:     General: Skin is warm and dry.      Coloration: Skin is not jaundiced.      Findings: No bruising, lesion or rash.   Neurological:      General: No focal deficit present.      Mental Status: He is alert and oriented to person, place, and time. Mental status is at baseline.       Labs:  Lab Results   Component Value Date    WBC 11.6 (H) 07/15/2024    NEUTROABS 5.52 (H) 07/15/2024    IGABSOL 0.07 07/15/2024    LYMPHSABS 5.06 (H) 07/15/2024    MONOSABS 0.67 07/15/2024    EOSABS 0.22 07/15/2024    BASOSABS 0.06 07/15/2024    RBC 4.32 (L) 07/15/2024     (H) 07/15/2024    MCHC 34.3 07/15/2024    HGB 15.4 07/15/2024    HCT 44.9 07/15/2024     07/15/2024     Lab Results   Component Value Date    CREATININE 1.20 07/15/2024    BUN 32 (H) 07/15/2024    EGFR 65 07/15/2024     07/15/2024    K 3.9 07/15/2024     07/15/2024    CO2 28 07/15/2024      Lab Results   Component Value Date    ALT 26 07/15/2024    AST 21 07/15/2024    ALKPHOS 89 07/15/2024    BILITOT 0.6 07/15/2024        Performance Status:  Symptomatic; fully ambulatory    Assessment/Plan      66 y/o man referred for incidental finding of high platelet count. GERTRUDE-2 positive. Started on Hydrea 500mg by mouth daily in August 2020. Increased to 2-1-2-1 schedule on Nov 11, 2020. Tolerating this regimen well.     1) GERTRUDE-2 mutation   -Currently, he is on Hydrea 1000 mg, p.o. 3 days per week and 500 mg, p.o. 4 days per week.   -He tolerates treatment well.    -Labs done today: WBC 11.0, ANC 5.51, Hgb 15.2, , platelets 336,000, creatinine 1.12  -WBC, ANC, Hgb, platelets counts are within the normal range.   -Advised patient to continue current regimen of hydrea without changes.   -I will see him back in 4 months to follow up with labs done a few days prior to the next vsiit.     3/14/2024:   - Presents for follow up  -Currently, he is on Hydrea 1000 mg, p.o. 3 days per week and 500 mg, p.o. 4 days per week  - Labs reviewed: WBC 10.0, hgb 15.4, plt 324,000  - He reports doing well with no concerns or complaints   - He was advised to continue his Hydrea at current dosage  - He will work with Colette from our pharmacy team as he is retiring soon and will have medication changes   - He will RTC in 4 months for follow up with labs     7/15/2024:   - Presents for 4 month visit   - Currently, he is on Hydrea 1000 mg, p.o. 3 days per week and 500 mg, p.o. 4 days per week  - Labs reviewed: WBC 11.6, hgb 15.4, , plt 322,000, creatinine 1.20, GFR 65, ALT 26, AST 21  - He reports no issues from the Hydrea   - He was advised to continue hydrea at current dosage  - Discussed with patient that since he has not seen an oncologist/hematologist in several years I would like him to meet with Dr. Villatoro for his history of Paco 2 mutation to ensure he remains on the optimal treatment option and he agreed with plan   - Due to slight elevation in WBC we will have him RTC in 3 months      2) Anxiety   - Taking Lexapro 5mg daily.   - Follows with Dr. Jones, his PCP      Diagnoses and all orders for this visit:  Essential thrombocythemia (Multi)  -     Clinic Appointment Request JOON MARQUEZ  -     Clinic Appointment Request GLADIS VILLATORO; Future        MIKE Oconnor-CNP

## 2024-07-12 DIAGNOSIS — M19.90 ARTHRITIS: ICD-10-CM

## 2024-07-12 RX ORDER — MELOXICAM 15 MG/1
15 TABLET ORAL DAILY PRN
Qty: 30 TABLET | Refills: 3 | Status: SHIPPED | OUTPATIENT
Start: 2024-07-12

## 2024-07-15 ENCOUNTER — APPOINTMENT (OUTPATIENT)
Dept: HEMATOLOGY/ONCOLOGY | Facility: CLINIC | Age: 69
End: 2024-07-15
Payer: COMMERCIAL

## 2024-07-15 ENCOUNTER — LAB (OUTPATIENT)
Dept: LAB | Facility: CLINIC | Age: 69
End: 2024-07-15
Payer: COMMERCIAL

## 2024-07-15 VITALS
BODY MASS INDEX: 31.59 KG/M2 | RESPIRATION RATE: 17 BRPM | DIASTOLIC BLOOD PRESSURE: 84 MMHG | HEART RATE: 87 BPM | TEMPERATURE: 96.8 F | SYSTOLIC BLOOD PRESSURE: 138 MMHG | OXYGEN SATURATION: 95 % | WEIGHT: 220.68 LBS

## 2024-07-15 DIAGNOSIS — D47.3 ESSENTIAL THROMBOCYTHEMIA (MULTI): ICD-10-CM

## 2024-07-15 DIAGNOSIS — D47.3 ESSENTIAL THROMBOCYTHEMIA (MULTI): Primary | ICD-10-CM

## 2024-07-15 LAB
ALBUMIN SERPL BCP-MCNC: 4.4 G/DL (ref 3.4–5)
ALP SERPL-CCNC: 89 U/L (ref 33–136)
ALT SERPL W P-5'-P-CCNC: 26 U/L (ref 10–52)
ANION GAP SERPL CALC-SCNC: 14 MMOL/L (ref 10–20)
AST SERPL W P-5'-P-CCNC: 21 U/L (ref 9–39)
BASOPHILS # BLD AUTO: 0.06 X10*3/UL (ref 0–0.1)
BASOPHILS NFR BLD AUTO: 0.5 %
BILIRUB SERPL-MCNC: 0.6 MG/DL (ref 0–1.2)
BUN SERPL-MCNC: 32 MG/DL (ref 6–23)
CALCIUM SERPL-MCNC: 10.2 MG/DL (ref 8.6–10.3)
CHLORIDE SERPL-SCNC: 102 MMOL/L (ref 98–107)
CO2 SERPL-SCNC: 28 MMOL/L (ref 21–32)
CREAT SERPL-MCNC: 1.2 MG/DL (ref 0.5–1.3)
EGFRCR SERPLBLD CKD-EPI 2021: 65 ML/MIN/1.73M*2
EOSINOPHIL # BLD AUTO: 0.22 X10*3/UL (ref 0–0.4)
EOSINOPHIL NFR BLD AUTO: 1.9 %
ERYTHROCYTE [DISTWIDTH] IN BLOOD BY AUTOMATED COUNT: 13.5 % (ref 11.5–14.5)
GLUCOSE SERPL-MCNC: 127 MG/DL (ref 74–99)
HCT VFR BLD AUTO: 44.9 % (ref 41–52)
HGB BLD-MCNC: 15.4 G/DL (ref 13.5–17.5)
HOLD SPECIMEN: NORMAL
IMM GRANULOCYTES # BLD AUTO: 0.07 X10*3/UL (ref 0–0.5)
IMM GRANULOCYTES NFR BLD AUTO: 0.6 % (ref 0–0.9)
LYMPHOCYTES # BLD AUTO: 5.06 X10*3/UL (ref 0.8–3)
LYMPHOCYTES NFR BLD AUTO: 43.6 %
MCH RBC QN AUTO: 35.6 PG (ref 26–34)
MCHC RBC AUTO-ENTMCNC: 34.3 G/DL (ref 32–36)
MCV RBC AUTO: 104 FL (ref 80–100)
MONOCYTES # BLD AUTO: 0.67 X10*3/UL (ref 0.05–0.8)
MONOCYTES NFR BLD AUTO: 5.8 %
NEUTROPHILS # BLD AUTO: 5.52 X10*3/UL (ref 1.6–5.5)
NEUTROPHILS NFR BLD AUTO: 47.6 %
PLATELET # BLD AUTO: 322 X10*3/UL (ref 150–450)
POTASSIUM SERPL-SCNC: 3.9 MMOL/L (ref 3.5–5.3)
PROT SERPL-MCNC: 7.1 G/DL (ref 6.4–8.2)
RBC # BLD AUTO: 4.32 X10*6/UL (ref 4.5–5.9)
SODIUM SERPL-SCNC: 140 MMOL/L (ref 136–145)
WBC # BLD AUTO: 11.6 X10*3/UL (ref 4.4–11.3)

## 2024-07-15 PROCEDURE — 36415 COLL VENOUS BLD VENIPUNCTURE: CPT

## 2024-07-15 PROCEDURE — 1126F AMNT PAIN NOTED NONE PRSNT: CPT

## 2024-07-15 PROCEDURE — 99214 OFFICE O/P EST MOD 30 MIN: CPT

## 2024-07-15 PROCEDURE — 80053 COMPREHEN METABOLIC PANEL: CPT

## 2024-07-15 PROCEDURE — 85025 COMPLETE CBC W/AUTO DIFF WBC: CPT

## 2024-07-15 ASSESSMENT — PAIN SCALES - GENERAL: PAINLEVEL: 0-NO PAIN

## 2024-07-16 DIAGNOSIS — D47.3 ESSENTIAL THROMBOCYTHEMIA (MULTI): ICD-10-CM

## 2024-08-26 DIAGNOSIS — D45 POLYCYTHEMIA VERA (MULTI): ICD-10-CM

## 2024-08-26 RX ORDER — HYDROXYUREA 500 MG/1
CAPSULE ORAL
Qty: 42 CAPSULE | Refills: 4 | Status: SHIPPED | OUTPATIENT
Start: 2024-08-26 | End: 2025-08-26

## 2024-08-26 NOTE — PROGRESS NOTES
Adriel Solis is a 69 y.o. 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  Adriel is currently being treated with hydroxyurea.    Labs on 7/15/24:  WBC 11.6, ANC 5.52, H/H 15.4/44.9, PLTs 322;  SCr 1.20, LFTs wnl.  Continue current therapy.    Per collaborative practice agreement with Dr. Rose, on 8/26/24 I refilled hydroxyurea 1000 mg (2 x 500 mg) PO once daily on Mon, Weds, Fri (3 days/week) and 500 mg PO once daily on Sun, Tues, Thurs, Sat (4 days/week), 30-day cycle.  Qty # 42 with 4 refill.  The prescription was sent to [id number] on [address] [facility].    Next FUV is 10/16/24 with Dr. Rose.      Jacob Blackwell, [facility], MS, BCOP  Clinical Pharmacist Specialist - Ambulatory Oncology

## 2024-09-11 ASSESSMENT — PROMIS GLOBAL HEALTH SCALE
RATE_AVERAGE_PAIN: 5
RATE_AVERAGE_FATIGUE: MODERATE
RATE_PHYSICAL_HEALTH: FAIR
RATE_SOCIAL_SATISFACTION: FAIR
RATE_GENERAL_HEALTH: FAIR
CARRYOUT_PHYSICAL_ACTIVITIES: MOSTLY
EMOTIONAL_PROBLEMS: OFTEN
CARRYOUT_SOCIAL_ACTIVITIES: GOOD
RATE_MENTAL_HEALTH: FAIR
RATE_QUALITY_OF_LIFE: GOOD

## 2024-09-12 ENCOUNTER — LAB (OUTPATIENT)
Dept: LAB | Facility: LAB | Age: 69
End: 2024-09-12
Payer: COMMERCIAL

## 2024-09-12 ENCOUNTER — APPOINTMENT (OUTPATIENT)
Dept: PRIMARY CARE | Facility: CLINIC | Age: 69
End: 2024-09-12
Payer: COMMERCIAL

## 2024-09-12 VITALS
OXYGEN SATURATION: 98 % | HEIGHT: 68 IN | SYSTOLIC BLOOD PRESSURE: 148 MMHG | DIASTOLIC BLOOD PRESSURE: 80 MMHG | HEART RATE: 68 BPM | RESPIRATION RATE: 16 BRPM | TEMPERATURE: 96.9 F | BODY MASS INDEX: 33.04 KG/M2 | WEIGHT: 218 LBS

## 2024-09-12 DIAGNOSIS — E78.5 HYPERLIPIDEMIA LDL GOAL <130: ICD-10-CM

## 2024-09-12 DIAGNOSIS — Z00.00 HEALTHCARE MAINTENANCE: ICD-10-CM

## 2024-09-12 DIAGNOSIS — Z00.00 HEALTHCARE MAINTENANCE: Primary | ICD-10-CM

## 2024-09-12 PROBLEM — M79.89 LEG SWELLING: Status: ACTIVE | Noted: 2024-09-12

## 2024-09-12 PROBLEM — M25.529 JOINT PAIN, ELBOW: Status: ACTIVE | Noted: 2024-09-12

## 2024-09-12 PROBLEM — G47.30 SLEEP APNEA: Status: ACTIVE | Noted: 2024-09-12

## 2024-09-12 PROBLEM — K63.5 BENIGN COLONIC POLYP: Status: ACTIVE | Noted: 2024-09-12

## 2024-09-12 PROBLEM — G45.9 TRANSIENT ISCHEMIC ATTACK: Status: ACTIVE | Noted: 2024-09-12

## 2024-09-12 PROBLEM — H49.22: Status: ACTIVE | Noted: 2024-09-12

## 2024-09-12 PROBLEM — G47.00 INSOMNIA: Status: ACTIVE | Noted: 2024-09-12

## 2024-09-12 PROBLEM — R49.0 VOICE HOARSENESS: Status: ACTIVE | Noted: 2024-09-12

## 2024-09-12 PROBLEM — K21.9 ESOPHAGEAL REFLUX: Status: ACTIVE | Noted: 2024-09-12

## 2024-09-12 PROBLEM — R51.9 PERSISTENT HEADACHES: Status: ACTIVE | Noted: 2024-09-12

## 2024-09-12 PROBLEM — D47.3 ESSENTIAL THROMBOCYTOSIS: Status: ACTIVE | Noted: 2024-09-12

## 2024-09-12 PROBLEM — I10 HTN, GOAL BELOW 130/80: Status: ACTIVE | Noted: 2024-09-12

## 2024-09-12 PROBLEM — M25.562 LEFT KNEE PAIN: Status: ACTIVE | Noted: 2024-09-12

## 2024-09-12 PROBLEM — N52.9 ERECTILE DYSFUNCTION: Status: ACTIVE | Noted: 2024-09-12

## 2024-09-12 PROBLEM — M79.669 CALF PAIN: Status: ACTIVE | Noted: 2024-09-12

## 2024-09-12 PROBLEM — F41.9 ANXIETY: Status: ACTIVE | Noted: 2024-09-12

## 2024-09-12 PROBLEM — D75.1 ERYTHROCYTOSIS: Status: ACTIVE | Noted: 2024-09-12

## 2024-09-12 LAB
ALBUMIN SERPL BCP-MCNC: 4.6 G/DL (ref 3.4–5)
ALP SERPL-CCNC: 73 U/L (ref 33–136)
ALT SERPL W P-5'-P-CCNC: 24 U/L (ref 10–52)
ANION GAP SERPL CALC-SCNC: 14 MMOL/L (ref 10–20)
AST SERPL W P-5'-P-CCNC: 21 U/L (ref 9–39)
BASOPHILS # BLD AUTO: 0.05 X10*3/UL (ref 0–0.1)
BASOPHILS NFR BLD AUTO: 0.6 %
BILIRUB SERPL-MCNC: 0.8 MG/DL (ref 0–1.2)
BUN SERPL-MCNC: 19 MG/DL (ref 6–23)
CALCIUM SERPL-MCNC: 9.2 MG/DL (ref 8.6–10.6)
CHLORIDE SERPL-SCNC: 107 MMOL/L (ref 98–107)
CHOLEST SERPL-MCNC: 104 MG/DL (ref 0–199)
CHOLESTEROL/HDL RATIO: 2.4
CO2 SERPL-SCNC: 25 MMOL/L (ref 21–32)
CREAT SERPL-MCNC: 1.06 MG/DL (ref 0.5–1.3)
EGFRCR SERPLBLD CKD-EPI 2021: 75 ML/MIN/1.73M*2
EOSINOPHIL # BLD AUTO: 0.08 X10*3/UL (ref 0–0.4)
EOSINOPHIL NFR BLD AUTO: 1 %
ERYTHROCYTE [DISTWIDTH] IN BLOOD BY AUTOMATED COUNT: 15.3 % (ref 11.5–14.5)
EST. AVERAGE GLUCOSE BLD GHB EST-MCNC: 105 MG/DL
GLUCOSE SERPL-MCNC: 106 MG/DL (ref 74–99)
HBA1C MFR BLD: 5.3 %
HCT VFR BLD AUTO: 44.1 % (ref 41–52)
HDLC SERPL-MCNC: 43.4 MG/DL
HGB BLD-MCNC: 14.8 G/DL (ref 13.5–17.5)
IMM GRANULOCYTES # BLD AUTO: 0.03 X10*3/UL (ref 0–0.5)
IMM GRANULOCYTES NFR BLD AUTO: 0.4 % (ref 0–0.9)
LDLC SERPL CALC-MCNC: 49 MG/DL
LYMPHOCYTES # BLD AUTO: 3.37 X10*3/UL (ref 0.8–3)
LYMPHOCYTES NFR BLD AUTO: 43 %
MCH RBC QN AUTO: 35.7 PG (ref 26–34)
MCHC RBC AUTO-ENTMCNC: 33.6 G/DL (ref 32–36)
MCV RBC AUTO: 107 FL (ref 80–100)
MONOCYTES # BLD AUTO: 0.41 X10*3/UL (ref 0.05–0.8)
MONOCYTES NFR BLD AUTO: 5.2 %
NEUTROPHILS # BLD AUTO: 3.89 X10*3/UL (ref 1.6–5.5)
NEUTROPHILS NFR BLD AUTO: 49.8 %
NON HDL CHOLESTEROL: 61 MG/DL (ref 0–149)
NRBC BLD-RTO: 0 /100 WBCS (ref 0–0)
PLATELET # BLD AUTO: 308 X10*3/UL (ref 150–450)
POTASSIUM SERPL-SCNC: 4.3 MMOL/L (ref 3.5–5.3)
PROT SERPL-MCNC: 6.8 G/DL (ref 6.4–8.2)
PSA SERPL-MCNC: 1.13 NG/ML
RBC # BLD AUTO: 4.14 X10*6/UL (ref 4.5–5.9)
SODIUM SERPL-SCNC: 142 MMOL/L (ref 136–145)
TRIGL SERPL-MCNC: 60 MG/DL (ref 0–149)
VLDL: 12 MG/DL (ref 0–40)
WBC # BLD AUTO: 7.8 X10*3/UL (ref 4.4–11.3)

## 2024-09-12 PROCEDURE — 80053 COMPREHEN METABOLIC PANEL: CPT

## 2024-09-12 PROCEDURE — 3079F DIAST BP 80-89 MM HG: CPT | Performed by: INTERNAL MEDICINE

## 2024-09-12 PROCEDURE — 1036F TOBACCO NON-USER: CPT | Performed by: INTERNAL MEDICINE

## 2024-09-12 PROCEDURE — 80061 LIPID PANEL: CPT

## 2024-09-12 PROCEDURE — 85025 COMPLETE CBC W/AUTO DIFF WBC: CPT

## 2024-09-12 PROCEDURE — 90471 IMMUNIZATION ADMIN: CPT | Performed by: INTERNAL MEDICINE

## 2024-09-12 PROCEDURE — 90662 IIV NO PRSV INCREASED AG IM: CPT | Performed by: INTERNAL MEDICINE

## 2024-09-12 PROCEDURE — 3008F BODY MASS INDEX DOCD: CPT | Performed by: INTERNAL MEDICINE

## 2024-09-12 PROCEDURE — 1123F ACP DISCUSS/DSCN MKR DOCD: CPT | Performed by: INTERNAL MEDICINE

## 2024-09-12 PROCEDURE — 36415 COLL VENOUS BLD VENIPUNCTURE: CPT

## 2024-09-12 PROCEDURE — 93000 ELECTROCARDIOGRAM COMPLETE: CPT | Performed by: INTERNAL MEDICINE

## 2024-09-12 PROCEDURE — 1158F ADVNC CARE PLAN TLK DOCD: CPT | Performed by: INTERNAL MEDICINE

## 2024-09-12 PROCEDURE — 84153 ASSAY OF PSA TOTAL: CPT

## 2024-09-12 PROCEDURE — 99397 PER PM REEVAL EST PAT 65+ YR: CPT | Performed by: INTERNAL MEDICINE

## 2024-09-12 PROCEDURE — 3077F SYST BP >= 140 MM HG: CPT | Performed by: INTERNAL MEDICINE

## 2024-09-12 PROCEDURE — 83036 HEMOGLOBIN GLYCOSYLATED A1C: CPT

## 2024-09-12 ASSESSMENT — PATIENT HEALTH QUESTIONNAIRE - PHQ9
SUM OF ALL RESPONSES TO PHQ9 QUESTIONS 1 AND 2: 0
1. LITTLE INTEREST OR PLEASURE IN DOING THINGS: NOT AT ALL
2. FEELING DOWN, DEPRESSED OR HOPELESS: NOT AT ALL

## 2024-09-12 ASSESSMENT — ENCOUNTER SYMPTOMS
ABDOMINAL PAIN: 0
FREQUENCY: 0
SLEEP DISTURBANCE: 1
CONSTIPATION: 0
DIARRHEA: 0

## 2024-09-12 NOTE — PROGRESS NOTES
Patient here for a physical     Subjective   Patient ID: Adriel Solis is a 69 y.o. male who presents for Annual Exam.    The patient is currently following with Dermatology for a skin lesion confirmed to be Basal Cell Carcinoma.  He states that he will be undergoing a MOHS procedure soon.    The patient is currently going through a difficult divorce.  He is adjusting as well as can be expected given the circumstances.  The patient has four adult children.    The patient notes some sleep disturbance due to overthinking at night time despite trying Melatonin over the counter.    The patient maintains an active lifestyle with regular walking.  He has a history of knee replacement surgery, and feels that this is working well.  The patient denies any chest pressure or chest pain.    The patient notes right-sided ear fullness.  He denies any hearing impairment.    The patient denies any abdominal pain or bowel problems. He takes Metamucil supplementation regularly, and finds this is working well.    The patient mentions nocturia of two times per evening, and finds that this is tolerable.  He denies any other urinary symptoms.     The patient will be retiring in one month.        Review of Systems   HENT:  Negative for hearing loss.         Positive for right ear fullness.   Cardiovascular:  Negative for chest pain.   Gastrointestinal:  Negative for abdominal pain, constipation and diarrhea.   Genitourinary:  Negative for frequency.        Positive for nocturia of two times per evening.   Psychiatric/Behavioral:  Positive for sleep disturbance.         Positive for life stress.       Objective   Physical Exam  Constitutional:       Appearance: Normal appearance.   Neck:      Vascular: No carotid bruit.   Cardiovascular:      Rate and Rhythm: Normal rate and regular rhythm.      Heart sounds: Normal heart sounds.   Pulmonary:      Effort: Pulmonary effort is normal.      Breath sounds: Normal breath sounds.   Abdominal:       General: Bowel sounds are normal.      Palpations: Abdomen is soft.      Tenderness: There is no abdominal tenderness.   Skin:     General: Skin is warm and dry.   Neurological:      General: No focal deficit present.      Mental Status: He is alert and oriented to person, place, and time. Mental status is at baseline.   Psychiatric:         Mood and Affect: Mood normal.         Behavior: Behavior normal.         Assessment/Plan   Problem List Items Addressed This Visit             ICD-10-CM    Hyperlipidemia LDL goal <130 - Primary E78.5    Relevant Orders    ECG 12 lead (Clinic Performed) (Completed)     Other Visit Diagnoses         Codes    Healthcare maintenance     Z00.00    Relevant Orders    Lipid panel    PSA    Comprehensive metabolic panel    CBC and Auto Differential    Hemoglobin A1c            CPE Performed  -  Discussed healthy diet and regular exercise.    -  Physical exam overall unremarkable. Immunizations reviewed and updated accordingly. Healthy lifestyle choices discussed (tobacco avoidance, appropriate alcohol use, avoidance of illicit substances).   -  Patient is wearing seatbelt.   -  Screening lab work ordered as indicated.    -  Age appropriate screening tests reviewed with patient.        EKG unremarkable compared to previous.      IMPRESSIONS/PLAN:     Sleep Disturbance  - Advised patient to try Sleep 3 [employer] melatonin.  Call the clinic if symptoms persist or worsen.     Right Excessive Cerumen  - Advised patient to try Debrox drops over the counter.  Call the clinic if symptoms persist or worsen.     HTN   - /80 in office today, continue on Losartan 50mg QD and HCTZ 12.5mg QD.      HLD   - Stable, continue on atorvastatin 40mg QD.      Anxiety   - Symptoms stable.  Previously on escitalopram 5mg QD.      Suspected sleep apnea   - Previously ordered for referral to Sleep Medicine.    Essential Thrombocytopenia  - Last platelets wnl per 7/2024.  Currently following  with  in Oncology, maintained on Hydrea 500mg as directed.     Basal Cell Carcinoma  - Following with Dermatology.    Health Maintenance   - Routine labs ordered including CBC, CMP, and a lipid panel to be completed in the fasting state. Added PSA, and HbA1c. Last PSA wnl 9/2022. Last colonoscopy performed 12/2017, due for repeat 2027.  Patient received High Dose Influenza vaccine in the clinic today, tolerated well.      Follow up for regular wellness examinations, call sooner if needed.        Scribe Attestation  By signing my name below, I, Olivia Gaviria   attest that this documentation has been prepared under the direction and in the presence of Wilder Jones DO.   Katja Machado 09/12/24 9:34 AM

## 2024-10-11 ENCOUNTER — LAB (OUTPATIENT)
Dept: LAB | Facility: CLINIC | Age: 69
End: 2024-10-11
Payer: MEDICARE

## 2024-10-11 DIAGNOSIS — D47.3 ESSENTIAL THROMBOCYTHEMIA (MULTI): ICD-10-CM

## 2024-10-11 LAB
ALBUMIN SERPL BCP-MCNC: 4.5 G/DL (ref 3.4–5)
ALP SERPL-CCNC: 69 U/L (ref 33–136)
ALT SERPL W P-5'-P-CCNC: 24 U/L (ref 10–52)
ANION GAP SERPL CALC-SCNC: 12 MMOL/L (ref 10–20)
AST SERPL W P-5'-P-CCNC: 21 U/L (ref 9–39)
BASOPHILS # BLD AUTO: 0.07 X10*3/UL (ref 0–0.1)
BASOPHILS NFR BLD AUTO: 0.7 %
BILIRUB SERPL-MCNC: 0.7 MG/DL (ref 0–1.2)
BUN SERPL-MCNC: 31 MG/DL (ref 6–23)
CALCIUM SERPL-MCNC: 9.7 MG/DL (ref 8.6–10.3)
CHLORIDE SERPL-SCNC: 104 MMOL/L (ref 98–107)
CO2 SERPL-SCNC: 29 MMOL/L (ref 21–32)
CREAT SERPL-MCNC: 1.23 MG/DL (ref 0.5–1.3)
EGFRCR SERPLBLD CKD-EPI 2021: 63 ML/MIN/1.73M*2
EOSINOPHIL # BLD AUTO: 0.1 X10*3/UL (ref 0–0.4)
EOSINOPHIL NFR BLD AUTO: 1 %
ERYTHROCYTE [DISTWIDTH] IN BLOOD BY AUTOMATED COUNT: 13.6 % (ref 11.5–14.5)
GLUCOSE SERPL-MCNC: 105 MG/DL (ref 74–99)
HCT VFR BLD AUTO: 43.3 % (ref 41–52)
HGB BLD-MCNC: 14.7 G/DL (ref 13.5–17.5)
IMM GRANULOCYTES # BLD AUTO: 0.02 X10*3/UL (ref 0–0.5)
IMM GRANULOCYTES NFR BLD AUTO: 0.2 % (ref 0–0.9)
LYMPHOCYTES # BLD AUTO: 3.88 X10*3/UL (ref 0.8–3)
LYMPHOCYTES NFR BLD AUTO: 38.1 %
MCH RBC QN AUTO: 35.4 PG (ref 26–34)
MCHC RBC AUTO-ENTMCNC: 33.9 G/DL (ref 32–36)
MCV RBC AUTO: 104 FL (ref 80–100)
MONOCYTES # BLD AUTO: 0.76 X10*3/UL (ref 0.05–0.8)
MONOCYTES NFR BLD AUTO: 7.5 %
NEUTROPHILS # BLD AUTO: 5.36 X10*3/UL (ref 1.6–5.5)
NEUTROPHILS NFR BLD AUTO: 52.5 %
PLATELET # BLD AUTO: 341 X10*3/UL (ref 150–450)
POTASSIUM SERPL-SCNC: 4.4 MMOL/L (ref 3.5–5.3)
PROT SERPL-MCNC: 6.8 G/DL (ref 6.4–8.2)
RBC # BLD AUTO: 4.15 X10*6/UL (ref 4.5–5.9)
SODIUM SERPL-SCNC: 141 MMOL/L (ref 136–145)
WBC # BLD AUTO: 10.2 X10*3/UL (ref 4.4–11.3)

## 2024-10-11 PROCEDURE — 80053 COMPREHEN METABOLIC PANEL: CPT

## 2024-10-11 PROCEDURE — 85025 COMPLETE CBC W/AUTO DIFF WBC: CPT

## 2024-10-11 PROCEDURE — 36415 COLL VENOUS BLD VENIPUNCTURE: CPT

## 2024-10-16 ENCOUNTER — OFFICE VISIT (OUTPATIENT)
Dept: HEMATOLOGY/ONCOLOGY | Facility: CLINIC | Age: 69
End: 2024-10-16
Payer: MEDICARE

## 2024-10-16 VITALS
RESPIRATION RATE: 16 BRPM | SYSTOLIC BLOOD PRESSURE: 144 MMHG | DIASTOLIC BLOOD PRESSURE: 81 MMHG | OXYGEN SATURATION: 96 % | TEMPERATURE: 97 F | WEIGHT: 213.19 LBS | BODY MASS INDEX: 32.41 KG/M2 | HEART RATE: 89 BPM

## 2024-10-16 DIAGNOSIS — I10 PRIMARY HYPERTENSION: ICD-10-CM

## 2024-10-16 DIAGNOSIS — D47.3 ESSENTIAL THROMBOCYTHEMIA (MULTI): Primary | ICD-10-CM

## 2024-10-16 DIAGNOSIS — E78.2 MIXED HYPERLIPIDEMIA: ICD-10-CM

## 2024-10-16 DIAGNOSIS — M19.90 OSTEOARTHRITIS, UNSPECIFIED OSTEOARTHRITIS TYPE, UNSPECIFIED SITE: ICD-10-CM

## 2024-10-16 PROCEDURE — 99214 OFFICE O/P EST MOD 30 MIN: CPT | Performed by: INTERNAL MEDICINE

## 2024-10-16 RX ORDER — ASPIRIN 81 MG/1
81 TABLET ORAL DAILY
COMMUNITY

## 2024-10-16 ASSESSMENT — ENCOUNTER SYMPTOMS
NEUROLOGICAL NEGATIVE: 1
ENDOCRINE NEGATIVE: 1
RESPIRATORY NEGATIVE: 1
HEMATOLOGIC/LYMPHATIC NEGATIVE: 1
GASTROINTESTINAL NEGATIVE: 1
MUSCULOSKELETAL NEGATIVE: 1
EYES NEGATIVE: 1
CONSTITUTIONAL NEGATIVE: 1
CARDIOVASCULAR NEGATIVE: 1
PSYCHIATRIC NEGATIVE: 1

## 2024-10-16 ASSESSMENT — PAIN SCALES - GENERAL: PAINLEVEL_OUTOF10: 0-NO PAIN

## 2024-10-16 NOTE — PROGRESS NOTES
Patient ID: Adriel Solis is a 69 y.o. male.  Referring Physician: Neeraj Donahue, APRN-CNP  [id number]  [place] [address]  Primary Care Provider: Wilder Jones DO  Visit Type: Follow Up      Subjective    HPI I am doing okay    Review of Systems   Constitutional: Negative.    HENT:  Negative.     Eyes: Negative.    Respiratory: Negative.     Cardiovascular: Negative.    Gastrointestinal: Negative.    Endocrine: Negative.    Genitourinary: Negative.     Musculoskeletal: Negative.    Skin: Negative.    Neurological: Negative.    Hematological: Negative.    Psychiatric/Behavioral: Negative.          Objective   BSA: 2.15 meters squared  /81 (BP Location: Right arm)   Pulse 89   Temp 36.1 °C (97 °F) (Temporal)   Resp 16   Wt 96.7 kg (213 lb 3 oz)   SpO2 96%   BMI 32.41 kg/m²      has a past medical history of Other specified abnormal findings of blood chemistry (02/11/2019), Personal history of diseases of the blood and blood-forming organs and certain disorders involving the immune mechanism (02/11/2019), Personal history of diseases of the skin and subcutaneous tissue (07/15/2021), Personal history of other diseases of the respiratory system (04/18/2015), and Unspecified voice and resonance disorder (07/29/2021).   has a past surgical history that includes Colonoscopy (12/13/2012); Total knee arthroplasty (11/16/2014); Ankle surgery (01/16/2014); Other surgical history (01/16/2014); Shoulder surgery (01/16/2014); Knee surgery (01/16/2014); and MR angio head wo IV contrast (9/11/2017).  Family History   Problem Relation Name Age of Onset    No Known Problems Mother      Dementia Father      No Known Problems Brother       Oncology History    No history exists.       Adriel Solis  reports that he has quit smoking. His smoking use included cigarettes. He has never used smokeless tobacco.  He  reports current alcohol use.  He  reports no history of drug use.    Physical  Exam  Vitals reviewed.   Constitutional:       Appearance: Normal appearance.   HENT:      Head: Normocephalic.      Mouth/Throat:      Mouth: Mucous membranes are moist.   Eyes:      Extraocular Movements: Extraocular movements intact.      Pupils: Pupils are equal, round, and reactive to light.   Cardiovascular:      Rate and Rhythm: Normal rate and regular rhythm.      Pulses: Normal pulses.      Heart sounds: Normal heart sounds.   Pulmonary:      Effort: Pulmonary effort is normal.      Breath sounds: Normal breath sounds.   Abdominal:      General: Bowel sounds are normal.      Palpations: Abdomen is soft.   Musculoskeletal:         General: Normal range of motion.      Cervical back: Normal range of motion and neck supple.   Skin:     General: Skin is warm.   Neurological:      General: No focal deficit present.      Mental Status: He is alert and oriented to person, place, and time.   Psychiatric:         Mood and Affect: Mood normal.         Behavior: Behavior normal.         WBC   Date/Time Value Ref Range Status   10/11/2024 02:00 PM 10.2 4.4 - 11.3 x10*3/uL Final   09/12/2024 10:11 AM 7.8 4.4 - 11.3 x10*3/uL Final   07/15/2024 09:01 AM 11.6 (H) 4.4 - 11.3 x10*3/uL Final     nRBC   Date Value Ref Range Status   09/12/2024 0.0 0.0 - 0.0 /100 WBCs Final   11/25/2022 0.0 0.0 - 0.0 /100 WBC Final   11/24/2022 0.0 0.0 - 0.0 /100 WBC Final   11/23/2022 0.0 0.0 - 0.0 /100 WBC Final     RBC   Date Value Ref Range Status   10/11/2024 4.15 (L) 4.50 - 5.90 x10*6/uL Final   09/12/2024 4.14 (L) 4.50 - 5.90 x10*6/uL Final   07/15/2024 4.32 (L) 4.50 - 5.90 x10*6/uL Final     Hemoglobin   Date Value Ref Range Status   10/11/2024 14.7 13.5 - 17.5 g/dL Final   09/12/2024 14.8 13.5 - 17.5 g/dL Final   07/15/2024 15.4 13.5 - 17.5 g/dL Final     Hematocrit   Date Value Ref Range Status   10/11/2024 43.3 41.0 - 52.0 % Final   09/12/2024 44.1 41.0 - 52.0 % Final   07/15/2024 44.9 41.0 - 52.0 % Final     MCV   Date/Time Value  "Ref Range Status   10/11/2024 02:00  (H) 80 - 100 fL Final   09/12/2024 10:11  (H) 80 - 100 fL Final   07/15/2024 09:01  (H) 80 - 100 fL Final     MCH   Date/Time Value Ref Range Status   10/11/2024 02:00 PM 35.4 (H) 26.0 - 34.0 pg Final   09/12/2024 10:11 AM 35.7 (H) 26.0 - 34.0 pg Final   07/15/2024 09:01 AM 35.6 (H) 26.0 - 34.0 pg Final     MCHC   Date/Time Value Ref Range Status   10/11/2024 02:00 PM 33.9 32.0 - 36.0 g/dL Final   09/12/2024 10:11 AM 33.6 32.0 - 36.0 g/dL Final   07/15/2024 09:01 AM 34.3 32.0 - 36.0 g/dL Final     RDW   Date/Time Value Ref Range Status   10/11/2024 02:00 PM 13.6 11.5 - 14.5 % Final   09/12/2024 10:11 AM 15.3 (H) 11.5 - 14.5 % Final   07/15/2024 09:01 AM 13.5 11.5 - 14.5 % Final     Platelets   Date/Time Value Ref Range Status   10/11/2024 02:00  150 - 450 x10*3/uL Final   09/12/2024 10:11  150 - 450 x10*3/uL Final   07/15/2024 09:01  150 - 450 x10*3/uL Final     No results found for: \"MPV\"  Neutrophils %   Date/Time Value Ref Range Status   10/11/2024 02:00 PM 52.5 40.0 - 80.0 % Final   09/12/2024 10:11 AM 49.8 40.0 - 80.0 % Final   07/15/2024 09:01 AM 47.6 40.0 - 80.0 % Final     Immature Granulocytes %, Automated   Date/Time Value Ref Range Status   10/11/2024 02:00 PM 0.2 0.0 - 0.9 % Final     Comment:     Immature Granulocyte Count (IG) includes promyelocytes, myelocytes and metamyelocytes but does not include bands. Percent differential counts (%) should be interpreted in the context of the absolute cell counts (cells/UL).   09/12/2024 10:11 AM 0.4 0.0 - 0.9 % Final     Comment:     Immature Granulocyte Count (IG) includes promyelocytes, myelocytes and metamyelocytes but does not include bands. Percent differential counts (%) should be interpreted in the context of the absolute cell counts (cells/UL).   07/15/2024 09:01 AM 0.6 0.0 - 0.9 % Final     Comment:     Immature Granulocyte Count (IG) includes promyelocytes, myelocytes and " metamyelocytes but does not include bands. Percent differential counts (%) should be interpreted in the context of the absolute cell counts (cells/UL).     Lymphocytes %   Date/Time Value Ref Range Status   10/11/2024 02:00 PM 38.1 13.0 - 44.0 % Final   09/12/2024 10:11 AM 43.0 13.0 - 44.0 % Final   07/15/2024 09:01 AM 43.6 13.0 - 44.0 % Final     Monocytes %   Date/Time Value Ref Range Status   10/11/2024 02:00 PM 7.5 2.0 - 10.0 % Final   09/12/2024 10:11 AM 5.2 2.0 - 10.0 % Final   07/15/2024 09:01 AM 5.8 2.0 - 10.0 % Final     Eosinophils %   Date/Time Value Ref Range Status   10/11/2024 02:00 PM 1.0 0.0 - 6.0 % Final   09/12/2024 10:11 AM 1.0 0.0 - 6.0 % Final   07/15/2024 09:01 AM 1.9 0.0 - 6.0 % Final     Basophils %   Date/Time Value Ref Range Status   10/11/2024 02:00 PM 0.7 0.0 - 2.0 % Final   09/12/2024 10:11 AM 0.6 0.0 - 2.0 % Final   07/15/2024 09:01 AM 0.5 0.0 - 2.0 % Final     Neutrophils Absolute   Date/Time Value Ref Range Status   10/11/2024 02:00 PM 5.36 1.60 - 5.50 x10*3/uL Final     Comment:     Percent differential counts (%) should be interpreted in the context of the absolute cell counts (cells/uL).   09/12/2024 10:11 AM 3.89 1.60 - 5.50 x10*3/uL Final     Comment:     Percent differential counts (%) should be interpreted in the context of the absolute cell counts (cells/uL).   07/15/2024 09:01 AM 5.52 (H) 1.60 - 5.50 x10*3/uL Final     Comment:     Percent differential counts (%) should be interpreted in the context of the absolute cell counts (cells/uL).     Immature Granulocytes Absolute, Automated   Date/Time Value Ref Range Status   10/11/2024 02:00 PM 0.02 0.00 - 0.50 x10*3/uL Final   09/12/2024 10:11 AM 0.03 0.00 - 0.50 x10*3/uL Final   07/15/2024 09:01 AM 0.07 0.00 - 0.50 x10*3/uL Final     Lymphocytes Absolute   Date/Time Value Ref Range Status   10/11/2024 02:00 PM 3.88 (H) 0.80 - 3.00 x10*3/uL Final   09/12/2024 10:11 AM 3.37 (H) 0.80 - 3.00 x10*3/uL Final   07/15/2024 09:01 AM 5.06  "(H) 0.80 - 3.00 x10*3/uL Final     Monocytes Absolute   Date/Time Value Ref Range Status   10/11/2024 02:00 PM 0.76 0.05 - 0.80 x10*3/uL Final   09/12/2024 10:11 AM 0.41 0.05 - 0.80 x10*3/uL Final   07/15/2024 09:01 AM 0.67 0.05 - 0.80 x10*3/uL Final     Eosinophils Absolute   Date/Time Value Ref Range Status   10/11/2024 02:00 PM 0.10 0.00 - 0.40 x10*3/uL Final   09/12/2024 10:11 AM 0.08 0.00 - 0.40 x10*3/uL Final   07/15/2024 09:01 AM 0.22 0.00 - 0.40 x10*3/uL Final     Basophils Absolute   Date/Time Value Ref Range Status   10/11/2024 02:00 PM 0.07 0.00 - 0.10 x10*3/uL Final   09/12/2024 10:11 AM 0.05 0.00 - 0.10 x10*3/uL Final   07/15/2024 09:01 AM 0.06 0.00 - 0.10 x10*3/uL Final       No components found for: \"PT\"  No results found for: \"APTT\"  Medication Documentation Review Audit       Reviewed by Gayla Gallegos MA (Medical Assistant) on 10/16/24 at 0919      Medication Order Taking? Sig Documenting Provider Last Dose Status   aspirin 81 mg EC tablet [id number] Yes Take 1 tablet (81 mg) by mouth once daily. Historical Provider, MD  Active   atorvastatin (Lipitor) 40 mg tablet [id number] Yes TAKE 1 TABLET BY MOUTH EVERYDAY AT BEDTIME Wilder Jones, DO  Active   hydroCHLOROthiazide (Microzide) 12.5 mg tablet [id number] Yes TAKE 1 TABLET BY MOUTH EVERY DAY Wilder Jones, DO  Active   hydroxyurea (Hydrea) 500 mg capsule [id number] Yes TAKE TWO (2) CAPSULES BY MOUTH ONCE A DAY ON MONDAY, WEDNESDAY, AND FRIDAY AND TAKE ONE (1) CAPSULE BY MOUTH ONCE A DAY THE REMAINING 4 DAYS EACH WEEK Jesus Alberto Rose MD  Active   losartan (Cozaar) 50 mg tablet [id number] Yes TAKE 1 TABLET BY MOUTH EVERY DAY Wilder Jones, DO  Active   meloxicam (Mobic) 15 mg tablet [id number] Yes TAKE 1 TABLET BY MOUTH EVERY DAY AS NEEDED FOR PAIN Wilder Jones, DO  Active   tadalafil (Cialis) 10 mg tablet [id number] Yes TAKE 1 TABLET (10 MG) BY MOUTH ONCE DAILY AS NEEDED FOR ERECTILE DYSFUNCTION. Wilder Jones, DO  Active                 "   Assessment/Plan    1) essential thrombocythemia  -originally referred to Dr Lake for leukocytosis/thrombocytosis in 2020  -JAK2+ by peripheral blood NGS, however, no bone marrow bx was done  -presumed to be ET, and started on hydrea 500 mg daily  -11/2020 hydrea titrated to 1000 mg x 3 days, 500 mg x 4 days a week  -doing well, has no complaints  -labs done on 10/11/2024 included CBC + COMP  -results reviewed--wbc 10.2, hgb 14.7, hematocrit 43.3%,  plt 341,000, ANC 5360, creatinine 1.23, alk phos 69, AST 21, ALT 24  -advised Edward to maintain hydrea as is, 1000 mg x 3 days, 500 mg x 4 days  -advised him to continue with daily baby ASA  -will see him again in 4 months    2) hypertension  -on hydrochlorothiazide  -on losartan    3) osteoarthritis  -on mobic PRN    4) hyperlipidemia  -on atorvastatin       Problem List Items Addressed This Visit    None  Visit Diagnoses         Codes    Essential thrombocythemia (Multi)     D47.3    Relevant Medications    aspirin 81 mg EC tablet    Other Relevant Orders    Clinic Appointment Request Follow Up; JESUS ALBERTO ROSE; [facility]    CBC and Auto Differential    Comprehensive metabolic panel                 Jesus Alberto Rose MD

## 2024-11-20 DIAGNOSIS — M19.90 ARTHRITIS: ICD-10-CM

## 2024-11-20 RX ORDER — MELOXICAM 15 MG/1
15 TABLET ORAL DAILY PRN
Qty: 30 TABLET | Refills: 3 | Status: SHIPPED | OUTPATIENT
Start: 2024-11-20

## 2024-12-04 ENCOUNTER — TELEPHONE (OUTPATIENT)
Dept: HEMATOLOGY/ONCOLOGY | Facility: CLINIC | Age: 69
End: 2024-12-04
Payer: MEDICARE

## 2024-12-04 DIAGNOSIS — D45 POLYCYTHEMIA VERA: ICD-10-CM

## 2024-12-04 RX ORDER — HYDROXYUREA 500 MG/1
CAPSULE ORAL
Qty: 42 CAPSULE | Refills: 5 | Status: SHIPPED | OUTPATIENT
Start: 2024-12-04 | End: 2025-12-04

## 2024-12-04 NOTE — PROGRESS NOTES
Adriel Solis is a 69 y.o. year old male patient who had a patient care encounter with Dr. Rose on 10/16/24 for ongoing management of his ET, JAK2+.  Ed is currently being treated with hydroxyurea.    Labs from 10/11/24:  WBC 10.2, ANC 5.36, H/H 14.7/43.3, PLTs 341;  SCr 1.23, LFTs wnl.  Continue current therapy.    Per collaborative practice agreement with Dr. Rose, on 12/4/24 I refilled hydroxyurea 1000 mg (2 x 500 mg) PO once daily on Mon, Weds and Fri;  and 500 mg PO once daily on Sun, Tues, Thurs, Sat, 30-day cycle.  Qty # 42 with 5 refills.  The prescription was sent to [id number].    Next FUV is 2/19/25.      Jacob Blackwell RPh, MS, BCOP  Clinical Pharmacist Specialist - Ambulatory Oncology

## 2024-12-18 DIAGNOSIS — I10 ESSENTIAL (PRIMARY) HYPERTENSION: ICD-10-CM

## 2024-12-18 RX ORDER — HYDROCHLOROTHIAZIDE 12.5 MG/1
TABLET ORAL
Qty: 90 TABLET | Refills: 2 | Status: SHIPPED | OUTPATIENT
Start: 2024-12-18

## 2025-01-01 ENCOUNTER — OFFICE VISIT (OUTPATIENT)
Dept: URGENT CARE | Age: 70
End: 2025-01-01
Payer: MEDICARE

## 2025-01-01 VITALS
SYSTOLIC BLOOD PRESSURE: 147 MMHG | OXYGEN SATURATION: 98 % | DIASTOLIC BLOOD PRESSURE: 88 MMHG | BODY MASS INDEX: 31.07 KG/M2 | HEART RATE: 95 BPM | HEIGHT: 70 IN | RESPIRATION RATE: 20 BRPM | WEIGHT: 217 LBS

## 2025-01-01 DIAGNOSIS — H61.21 IMPACTED CERUMEN OF RIGHT EAR: Primary | ICD-10-CM

## 2025-01-01 DIAGNOSIS — H92.01 OTALGIA OF RIGHT EAR: ICD-10-CM

## 2025-01-01 NOTE — PROGRESS NOTES
Subjective   Patient ID: Adriel Solis is a 69 y.o. male. They present today with a chief complaint of Earache.    History of Present Illness  Adriel is a 69-year-old male who presents to the urgent care for evaluation of right ear fullness for several days duration.  Patient states he often gets wax buildup and was told by his primary care provider to utilize over-the-counter drops for this.  Patient has attempted use of these in the past however notes recently using a Q-tip and feeling may be part of the Q-tip cotton was left behind or his ear is blocked with wax and would like this assessed and treated.  Patient denies drainage, bleeding, severe pain, ringing in the ear or other associated symptoms otherwise.  No other symptoms or concerns otherwise reported.    Past Medical History  Allergies as of 01/01/2025    (No Known Allergies)       (Not in a hospital admission)       Past Medical History:   Diagnosis Date    Other specified abnormal findings of blood chemistry 02/11/2019    Elevated platelet count    Personal history of diseases of the blood and blood-forming organs and certain disorders involving the immune mechanism 02/11/2019    History of leukocytosis    Personal history of diseases of the skin and subcutaneous tissue 07/15/2021    History of contact dermatitis    Personal history of other diseases of the respiratory system 04/18/2015    History of acute sinusitis    Unspecified voice and resonance disorder 07/29/2021    Change in voice       Past Surgical History:   Procedure Laterality Date    ANKLE SURGERY  01/16/2014    Ankle Surgery    COLONOSCOPY  12/13/2012    Complete Colonoscopy    KNEE SURGERY  01/16/2014    Knee Surgery    MR HEAD ANGIO WO IV CONTRAST  9/11/2017    MR HEAD ANGIO WO IV CONTRAST 9/11/2017 [facility]    OTHER SURGICAL HISTORY  01/16/2014    Incision And Drainage Of Skin Abscess, Simple    SHOULDER SURGERY  01/16/2014    Shoulder Surgery    TOTAL KNEE ARTHROPLASTY   "11/16/2014    Total Knee Arthroplasty        reports that he has quit smoking. His smoking use included cigarettes. He has never used smokeless tobacco. He reports current alcohol use. He reports that he does not use drugs.    Review of Systems  A 10-point review of systems was performed, otherwise unremarkable unless stated in the history of present illness.                Objective    Vitals:    01/01/25 0805   BP: 147/88   BP Location: Left arm   Patient Position: Sitting   BP Cuff Size: Adult   Pulse: 95   Resp: 20   SpO2: 98%   Weight: 98.4 kg (217 lb)   Height: 1.778 m (5' 10\")     No LMP for male patient.    Appearance: Alert, oriented, cooperative, in no acute distress. Well nourished & well hydrated.  Psychiatric: Appropriate mood and affect.  Neurological: no focal findings identified.  Head/Face: Atraumatic and normocephalic.   Eyes: Conjunctiva pink with no redness or exudates. Eyelids without lesions. No scleral icterus.   ENT: Hearing grossly intact. External inspection of ears without lesions or erythema. no nasal flaring. no tripoding, no drooling, no difficulty swallowing oral secretions.  **RIGHT TM obscured by cerumen, EAC non-erythematous, **LEFT TM good light reflex, non-bulging, EAC no erythema or edema.  [Cerumen was removed with irrigation, TM well visualized after, non-bulging, good light reflex].   Cardiac: Regular rate and rhythm no murmur.   Lungs: Clear to auscultation throughout, No evidence of wheezing, rhonchi or crackles. Good aeration throughout.   Extremities: Symmetrical, No peripheral edema  Skin: Intact, no lesions, rash, petechiae or purpura.       Point of Care Test & Imaging Results from this visit  No results found for this visit on 01/01/25.   No results found.    Diagnostic study results (if any) were reviewed by India Snider DO.    Assessment/Plan   Allergies, medications, history, and pertinent labs/EKGs/Imaging reviewed by India Snider DO.     Medical Decision " Making  Discussed with the patient symptoms and clinical presentation findings suggestive of a right ear cerumen impaction.  We advise close symptom monitoring supportive treatment.  We agreed to proceed with right ear irrigation after verbal consent obtained, this was tolerated well without complications with near complete resolution of cerumen impaction of the right ear.  We reviewed proper ear irrigation techniques and advised against use of any Q-tips. Follow up with PCP. We advised seeking immediate emergency medical attention if symptoms fail to improve, worsen or any concerning symptoms arise. Patient voiced full understanding and agreement to plan.      Orders and Diagnoses  Diagnoses and all orders for this visit:  Impacted cerumen of right ear  Otalgia of right ear      Medical Admin Record      Patient disposition: Home    Electronically signed by India Snider DO  8:39 AM

## 2025-01-19 DIAGNOSIS — E78.5 HYPERLIPIDEMIA, UNSPECIFIED: ICD-10-CM

## 2025-01-20 RX ORDER — ATORVASTATIN CALCIUM 40 MG/1
40 TABLET, FILM COATED ORAL NIGHTLY
Qty: 90 TABLET | Refills: 2 | Status: SHIPPED | OUTPATIENT
Start: 2025-01-20

## 2025-02-05 ENCOUNTER — APPOINTMENT (OUTPATIENT)
Dept: PRIMARY CARE | Facility: CLINIC | Age: 70
End: 2025-02-05
Payer: MEDICARE

## 2025-02-05 VITALS
RESPIRATION RATE: 16 BRPM | DIASTOLIC BLOOD PRESSURE: 80 MMHG | OXYGEN SATURATION: 100 % | TEMPERATURE: 96.7 F | WEIGHT: 216 LBS | SYSTOLIC BLOOD PRESSURE: 142 MMHG | HEART RATE: 116 BPM | BODY MASS INDEX: 30.99 KG/M2

## 2025-02-05 DIAGNOSIS — L65.9 HAIR LOSS: ICD-10-CM

## 2025-02-05 PROCEDURE — 3079F DIAST BP 80-89 MM HG: CPT | Performed by: INTERNAL MEDICINE

## 2025-02-05 PROCEDURE — 3077F SYST BP >= 140 MM HG: CPT | Performed by: INTERNAL MEDICINE

## 2025-02-05 PROCEDURE — 99214 OFFICE O/P EST MOD 30 MIN: CPT | Performed by: INTERNAL MEDICINE

## 2025-02-05 PROCEDURE — G2211 COMPLEX E/M VISIT ADD ON: HCPCS | Performed by: INTERNAL MEDICINE

## 2025-02-05 PROCEDURE — 1160F RVW MEDS BY RX/DR IN RCRD: CPT | Performed by: INTERNAL MEDICINE

## 2025-02-05 PROCEDURE — 1036F TOBACCO NON-USER: CPT | Performed by: INTERNAL MEDICINE

## 2025-02-05 PROCEDURE — 1159F MED LIST DOCD IN RCRD: CPT | Performed by: INTERNAL MEDICINE

## 2025-02-05 RX ORDER — FINASTERIDE 1 MG/1
1 TABLET, FILM COATED ORAL DAILY
Qty: 30 TABLET | Refills: 11 | Status: SHIPPED | OUTPATIENT
Start: 2025-02-05 | End: 2026-02-05

## 2025-02-05 RX ORDER — TOPIRAMATE 25 MG/1
50 TABLET ORAL NIGHTLY
Qty: 60 TABLET | Refills: 5 | Status: SHIPPED | OUTPATIENT
Start: 2025-02-05 | End: 2025-08-04

## 2025-02-05 ASSESSMENT — ENCOUNTER SYMPTOMS: ROS SKIN COMMENTS: POSITIVE FOR HAIR LOSS.

## 2025-02-05 NOTE — PROGRESS NOTES
Patient here to discuss weight loss and hair loss medication     Subjective   Patient ID: Adriel Solis is a 70 y.o. male who presents for Follow-up.    The patient is concerned about difficulty with losing weight despite his efforts.  He intends to increase physical activity by walking on his treadmill, and by taking out his dog.  The patient inquires about GLP-1 agonists and other medical management options.    The patient reports reduced hair growth as compared to baseline despite taking Biotin supplement regularly.  He inquires about management options.       Review of Systems   Skin:         Positive for hair loss.   All other systems reviewed and are negative.    Objective   Physical Exam  Constitutional:       Appearance: Normal appearance.   Neck:      Vascular: No carotid bruit.   Cardiovascular:      Rate and Rhythm: Normal rate and regular rhythm.      Heart sounds: Normal heart sounds.   Pulmonary:      Effort: Pulmonary effort is normal.      Breath sounds: Normal breath sounds.   Abdominal:      General: Bowel sounds are normal.      Palpations: Abdomen is soft.      Tenderness: There is no abdominal tenderness.   Skin:     General: Skin is warm and dry.   Neurological:      General: No focal deficit present.      Mental Status: He is alert and oriented to person, place, and time. Mental status is at baseline.   Psychiatric:         Mood and Affect: Mood normal.         Behavior: Behavior normal.         Assessment/Plan   Problem List Items Addressed This Visit    None  Visit Diagnoses         Codes    BMI 30.0-30.9,adult    -  Primary Z68.30    Relevant Medications    topiramate (Topamax) 25 mg tablet    Hair loss     L65.9    Relevant Medications    finasteride (Propecia) 1 mg tablet            IMPRESSIONS/PLAN:      BMI 30.0-30.9   - Prescribed topiramate 25mg as two tablets once nightly.  Discussed adverse effect profile and therapeutic expectations.  Encouraged patient to implement healthy diet  and increased physical activity of exercising 30 minutes a day five days a week.  Instructed patient to update clinic with progress.  Call the clinic if symptoms persist or worsen.     Hair Loss  - Prescribed finasteride 1mg once daily.  Discussed adverse effect profile in detail including increased risk for prostate cancer.  Explained that diligence is huggins, and that results won't be noticed until the four to six month dequan.  Patient verbalized understanding.  Call the clinic if symptoms persist or worsen.     HTN   - /80 in office today, continue on Losartan 50mg QD and HCTZ 12.5mg QD.      HLD   - Stable, continue on atorvastatin 40mg QD.      Anxiety   - Symptoms stable.  Previously on escitalopram 5mg QD.      Sleep Disturbance  - Advised patient to try Sleep 3 [employer]'s Bounty melatonin.  Call the clinic if symptoms persist or worsen.    Right Excessive Cerumen  - Advised patient to try Debrox drops over the counter.  Call the clinic if symptoms persist or worsen.     Suspected sleep apnea   - Previously ordered for referral to Sleep Medicine.     Essential Thrombocytopenia  - Last platelets wnl per 9/2024.  Currently following with  in Oncology, maintained on Hydrea 500mg as directed.     Basal Cell Carcinoma  - Following with Dermatology.     Health Maintenance   - Routine labs 9/2024. Last PSA wnl 9/2024. Last colonoscopy performed 12/2017, due for repeat 2027.       Follow up for regular wellness examinations, call sooner if needed.        Scribe Attestation  By signing my name below, I, Katja Machado, Olivia   attest that this documentation has been prepared under the direction and in the presence of Wilder Jones DO.   Katja Machado 02/05/25 11:35 AM

## 2025-02-13 ENCOUNTER — LAB (OUTPATIENT)
Dept: LAB | Facility: CLINIC | Age: 70
End: 2025-02-13
Payer: MEDICARE

## 2025-02-13 DIAGNOSIS — D47.3 ESSENTIAL THROMBOCYTHEMIA (MULTI): ICD-10-CM

## 2025-02-13 LAB
ALBUMIN SERPL BCP-MCNC: 4.8 G/DL (ref 3.4–5)
ALP SERPL-CCNC: 75 U/L (ref 33–136)
ALT SERPL W P-5'-P-CCNC: 22 U/L (ref 10–52)
ANION GAP SERPL CALC-SCNC: 15 MMOL/L (ref 10–20)
AST SERPL W P-5'-P-CCNC: 17 U/L (ref 9–39)
BASOPHILS # BLD AUTO: 0.05 X10*3/UL (ref 0–0.1)
BASOPHILS NFR BLD AUTO: 0.4 %
BILIRUB SERPL-MCNC: 0.6 MG/DL (ref 0–1.2)
BUN SERPL-MCNC: 32 MG/DL (ref 6–23)
CALCIUM SERPL-MCNC: 9.3 MG/DL (ref 8.6–10.3)
CHLORIDE SERPL-SCNC: 106 MMOL/L (ref 98–107)
CO2 SERPL-SCNC: 23 MMOL/L (ref 21–32)
CREAT SERPL-MCNC: 1.51 MG/DL (ref 0.5–1.3)
EGFRCR SERPLBLD CKD-EPI 2021: 49 ML/MIN/1.73M*2
EOSINOPHIL # BLD AUTO: 0.08 X10*3/UL (ref 0–0.4)
EOSINOPHIL NFR BLD AUTO: 0.6 %
ERYTHROCYTE [DISTWIDTH] IN BLOOD BY AUTOMATED COUNT: 13.6 % (ref 11.5–14.5)
GLUCOSE SERPL-MCNC: 97 MG/DL (ref 74–99)
HCT VFR BLD AUTO: 45.7 % (ref 41–52)
HGB BLD-MCNC: 15.8 G/DL (ref 13.5–17.5)
IMM GRANULOCYTES # BLD AUTO: 0.03 X10*3/UL (ref 0–0.5)
IMM GRANULOCYTES NFR BLD AUTO: 0.2 % (ref 0–0.9)
LYMPHOCYTES # BLD AUTO: 5.13 X10*3/UL (ref 0.8–3)
LYMPHOCYTES NFR BLD AUTO: 40 %
MCH RBC QN AUTO: 36.4 PG (ref 26–34)
MCHC RBC AUTO-ENTMCNC: 34.6 G/DL (ref 32–36)
MCV RBC AUTO: 105 FL (ref 80–100)
MONOCYTES # BLD AUTO: 0.86 X10*3/UL (ref 0.05–0.8)
MONOCYTES NFR BLD AUTO: 6.7 %
NEUTROPHILS # BLD AUTO: 6.69 X10*3/UL (ref 1.6–5.5)
NEUTROPHILS NFR BLD AUTO: 52.1 %
PLATELET # BLD AUTO: 361 X10*3/UL (ref 150–450)
POTASSIUM SERPL-SCNC: 3.8 MMOL/L (ref 3.5–5.3)
PROT SERPL-MCNC: 7.3 G/DL (ref 6.4–8.2)
RBC # BLD AUTO: 4.34 X10*6/UL (ref 4.5–5.9)
SODIUM SERPL-SCNC: 140 MMOL/L (ref 136–145)
WBC # BLD AUTO: 12.8 X10*3/UL (ref 4.4–11.3)

## 2025-02-13 PROCEDURE — 85025 COMPLETE CBC W/AUTO DIFF WBC: CPT

## 2025-02-13 PROCEDURE — 80053 COMPREHEN METABOLIC PANEL: CPT

## 2025-02-13 PROCEDURE — 36415 COLL VENOUS BLD VENIPUNCTURE: CPT

## 2025-02-19 ENCOUNTER — OFFICE VISIT (OUTPATIENT)
Dept: HEMATOLOGY/ONCOLOGY | Facility: CLINIC | Age: 70
End: 2025-02-19
Payer: MEDICARE

## 2025-02-19 VITALS
TEMPERATURE: 96.8 F | OXYGEN SATURATION: 98 % | RESPIRATION RATE: 16 BRPM | SYSTOLIC BLOOD PRESSURE: 113 MMHG | BODY MASS INDEX: 29.96 KG/M2 | DIASTOLIC BLOOD PRESSURE: 74 MMHG | HEART RATE: 103 BPM | WEIGHT: 208.78 LBS

## 2025-02-19 DIAGNOSIS — I10 PRIMARY HYPERTENSION: Primary | ICD-10-CM

## 2025-02-19 DIAGNOSIS — D47.3 ESSENTIAL THROMBOCYTHEMIA (MULTI): ICD-10-CM

## 2025-02-19 PROCEDURE — G2211 COMPLEX E/M VISIT ADD ON: HCPCS | Performed by: INTERNAL MEDICINE

## 2025-02-19 PROCEDURE — 99214 OFFICE O/P EST MOD 30 MIN: CPT | Performed by: INTERNAL MEDICINE

## 2025-02-19 PROCEDURE — 1126F AMNT PAIN NOTED NONE PRSNT: CPT | Performed by: INTERNAL MEDICINE

## 2025-02-19 PROCEDURE — 3074F SYST BP LT 130 MM HG: CPT | Performed by: INTERNAL MEDICINE

## 2025-02-19 PROCEDURE — 3078F DIAST BP <80 MM HG: CPT | Performed by: INTERNAL MEDICINE

## 2025-02-19 PROCEDURE — 1160F RVW MEDS BY RX/DR IN RCRD: CPT | Performed by: INTERNAL MEDICINE

## 2025-02-19 PROCEDURE — 1159F MED LIST DOCD IN RCRD: CPT | Performed by: INTERNAL MEDICINE

## 2025-02-19 ASSESSMENT — ENCOUNTER SYMPTOMS
EYES NEGATIVE: 1
CONSTITUTIONAL NEGATIVE: 1
GASTROINTESTINAL NEGATIVE: 1
NEUROLOGICAL NEGATIVE: 1
CARDIOVASCULAR NEGATIVE: 1
ENDOCRINE NEGATIVE: 1
MUSCULOSKELETAL NEGATIVE: 1
RESPIRATORY NEGATIVE: 1
HEMATOLOGIC/LYMPHATIC NEGATIVE: 1
PSYCHIATRIC NEGATIVE: 1

## 2025-02-19 ASSESSMENT — PAIN SCALES - GENERAL: PAINLEVEL_OUTOF10: 0-NO PAIN

## 2025-02-19 NOTE — PROGRESS NOTES
Patient ID: Adriel Solis is a 70 y.o. male.  Referring Physician: Jesus Alberto Rose MD  [address]  [place] [address]  Primary Care Provider: Wilder Jones DO  Visit Type: Follow Up      Subjective    HPI I need to find something to do    Review of Systems   Constitutional: Negative.    HENT:  Negative.     Eyes: Negative.    Respiratory: Negative.     Cardiovascular: Negative.    Gastrointestinal: Negative.    Endocrine: Negative.    Genitourinary: Negative.     Musculoskeletal: Negative.    Skin: Negative.    Neurological: Negative.    Hematological: Negative.    Psychiatric/Behavioral: Negative.          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   has a past surgical history that includes Colonoscopy (12/13/2012); Total knee arthroplasty (11/16/2014); Ankle surgery (01/16/2014); Other surgical history (01/16/2014); Shoulder surgery (01/16/2014); Knee surgery (01/16/2014); and MR angio head wo IV contrast (9/11/2017).  Family History   Problem Relation Name Age of Onset    No Known Problems Mother      Dementia Father      No Known Problems Brother       Oncology History    No history exists.       Adriel Solis  reports that he has quit smoking. His smoking use included cigarettes. He has never used smokeless tobacco.  He  reports current alcohol use.  He  reports no history of drug  use.    Physical Exam  Vitals reviewed.   Constitutional:       Appearance: Normal appearance.   HENT:      Head: Normocephalic.      Mouth/Throat:      Mouth: Mucous membranes are moist.   Eyes:      Extraocular Movements: Extraocular movements intact.      Pupils: Pupils are equal, round, and reactive to light.   Cardiovascular:      Rate and Rhythm: Normal rate and regular rhythm.      Pulses: Normal pulses.      Heart sounds: Normal heart sounds.   Pulmonary:      Effort: Pulmonary effort is normal.      Breath sounds: Normal breath sounds.   Abdominal:      General: Bowel sounds are normal.      Palpations: Abdomen is soft.   Musculoskeletal:         General: Normal range of motion.      Cervical back: Normal range of motion and neck supple.   Skin:     General: Skin is warm.   Neurological:      General: No focal deficit present.      Mental Status: He is alert and oriented to person, place, and time.   Psychiatric:         Mood and Affect: Mood normal.         Behavior: Behavior normal.         WBC   Date/Time Value Ref Range Status   02/13/2025 03:30 PM 12.8 (H) 4.4 - 11.3 x10*3/uL Final   10/11/2024 02:00 PM 10.2 4.4 - 11.3 x10*3/uL Final   09/12/2024 10:11 AM 7.8 4.4 - 11.3 x10*3/uL Final     nRBC   Date Value Ref Range Status   09/12/2024 0.0 0.0 - 0.0 /100 WBCs Final   11/25/2022 0.0 0.0 - 0.0 /100 WBC Final   11/24/2022 0.0 0.0 - 0.0 /100 WBC Final   11/23/2022 0.0 0.0 - 0.0 /100 WBC Final     RBC   Date Value Ref Range Status   02/13/2025 4.34 (L) 4.50 - 5.90 x10*6/uL Final   10/11/2024 4.15 (L) 4.50 - 5.90 x10*6/uL Final   09/12/2024 4.14 (L) 4.50 - 5.90 x10*6/uL Final     Hemoglobin   Date Value Ref Range Status   02/13/2025 15.8 13.5 - 17.5 g/dL Final   10/11/2024 14.7 13.5 - 17.5 g/dL Final   09/12/2024 14.8 13.5 - 17.5 g/dL Final     Hematocrit   Date Value Ref Range Status   02/13/2025 45.7 41.0 - 52.0 % Final   10/11/2024 43.3 41.0 - 52.0 % Final   09/12/2024 44.1 41.0 - 52.0 % Final     MCV  "  Date/Time Value Ref Range Status   02/13/2025 03:30  (H) 80 - 100 fL Final   10/11/2024 02:00  (H) 80 - 100 fL Final   09/12/2024 10:11  (H) 80 - 100 fL Final     MCH   Date/Time Value Ref Range Status   02/13/2025 03:30 PM 36.4 (H) 26.0 - 34.0 pg Final   10/11/2024 02:00 PM 35.4 (H) 26.0 - 34.0 pg Final   09/12/2024 10:11 AM 35.7 (H) 26.0 - 34.0 pg Final     MCHC   Date/Time Value Ref Range Status   02/13/2025 03:30 PM 34.6 32.0 - 36.0 g/dL Final   10/11/2024 02:00 PM 33.9 32.0 - 36.0 g/dL Final   09/12/2024 10:11 AM 33.6 32.0 - 36.0 g/dL Final     RDW   Date/Time Value Ref Range Status   02/13/2025 03:30 PM 13.6 11.5 - 14.5 % Final   10/11/2024 02:00 PM 13.6 11.5 - 14.5 % Final   09/12/2024 10:11 AM 15.3 (H) 11.5 - 14.5 % Final     Platelets   Date/Time Value Ref Range Status   02/13/2025 03:30  150 - 450 x10*3/uL Final   10/11/2024 02:00  150 - 450 x10*3/uL Final   09/12/2024 10:11  150 - 450 x10*3/uL Final     No results found for: \"MPV\"  Neutrophils %   Date/Time Value Ref Range Status   02/13/2025 03:30 PM 52.1 40.0 - 80.0 % Final   10/11/2024 02:00 PM 52.5 40.0 - 80.0 % Final   09/12/2024 10:11 AM 49.8 40.0 - 80.0 % Final     Immature Granulocytes %, Automated   Date/Time Value Ref Range Status   02/13/2025 03:30 PM 0.2 0.0 - 0.9 % Final     Comment:     Immature Granulocyte Count (IG) includes promyelocytes, myelocytes and metamyelocytes but does not include bands. Percent differential counts (%) should be interpreted in the context of the absolute cell counts (cells/UL).   10/11/2024 02:00 PM 0.2 0.0 - 0.9 % Final     Comment:     Immature Granulocyte Count (IG) includes promyelocytes, myelocytes and metamyelocytes but does not include bands. Percent differential counts (%) should be interpreted in the context of the absolute cell counts (cells/UL).   09/12/2024 10:11 AM 0.4 0.0 - 0.9 % Final     Comment:     Immature Granulocyte Count (IG) includes promyelocytes, " myelocytes and metamyelocytes but does not include bands. Percent differential counts (%) should be interpreted in the context of the absolute cell counts (cells/UL).     Lymphocytes %   Date/Time Value Ref Range Status   02/13/2025 03:30 PM 40.0 13.0 - 44.0 % Final   10/11/2024 02:00 PM 38.1 13.0 - 44.0 % Final   09/12/2024 10:11 AM 43.0 13.0 - 44.0 % Final     Monocytes %   Date/Time Value Ref Range Status   02/13/2025 03:30 PM 6.7 2.0 - 10.0 % Final   10/11/2024 02:00 PM 7.5 2.0 - 10.0 % Final   09/12/2024 10:11 AM 5.2 2.0 - 10.0 % Final     Eosinophils %   Date/Time Value Ref Range Status   02/13/2025 03:30 PM 0.6 0.0 - 6.0 % Final   10/11/2024 02:00 PM 1.0 0.0 - 6.0 % Final   09/12/2024 10:11 AM 1.0 0.0 - 6.0 % Final     Basophils %   Date/Time Value Ref Range Status   02/13/2025 03:30 PM 0.4 0.0 - 2.0 % Final   10/11/2024 02:00 PM 0.7 0.0 - 2.0 % Final   09/12/2024 10:11 AM 0.6 0.0 - 2.0 % Final     Neutrophils Absolute   Date/Time Value Ref Range Status   02/13/2025 03:30 PM 6.69 (H) 1.60 - 5.50 x10*3/uL Final     Comment:     Percent differential counts (%) should be interpreted in the context of the absolute cell counts (cells/uL).   10/11/2024 02:00 PM 5.36 1.60 - 5.50 x10*3/uL Final     Comment:     Percent differential counts (%) should be interpreted in the context of the absolute cell counts (cells/uL).   09/12/2024 10:11 AM 3.89 1.60 - 5.50 x10*3/uL Final     Comment:     Percent differential counts (%) should be interpreted in the context of the absolute cell counts (cells/uL).     Immature Granulocytes Absolute, Automated   Date/Time Value Ref Range Status   02/13/2025 03:30 PM 0.03 0.00 - 0.50 x10*3/uL Final   10/11/2024 02:00 PM 0.02 0.00 - 0.50 x10*3/uL Final   09/12/2024 10:11 AM 0.03 0.00 - 0.50 x10*3/uL Final     Lymphocytes Absolute   Date/Time Value Ref Range Status   02/13/2025 03:30 PM 5.13 (H) 0.80 - 3.00 x10*3/uL Final   10/11/2024 02:00 PM 3.88 (H) 0.80 - 3.00 x10*3/uL Final  "  09/12/2024 10:11 AM 3.37 (H) 0.80 - 3.00 x10*3/uL Final     Monocytes Absolute   Date/Time Value Ref Range Status   02/13/2025 03:30 PM 0.86 (H) 0.05 - 0.80 x10*3/uL Final   10/11/2024 02:00 PM 0.76 0.05 - 0.80 x10*3/uL Final   09/12/2024 10:11 AM 0.41 0.05 - 0.80 x10*3/uL Final     Eosinophils Absolute   Date/Time Value Ref Range Status   02/13/2025 03:30 PM 0.08 0.00 - 0.40 x10*3/uL Final   10/11/2024 02:00 PM 0.10 0.00 - 0.40 x10*3/uL Final   09/12/2024 10:11 AM 0.08 0.00 - 0.40 x10*3/uL Final     Basophils Absolute   Date/Time Value Ref Range Status   02/13/2025 03:30 PM 0.05 0.00 - 0.10 x10*3/uL Final   10/11/2024 02:00 PM 0.07 0.00 - 0.10 x10*3/uL Final   09/12/2024 10:11 AM 0.05 0.00 - 0.10 x10*3/uL Final       No components found for: \"PT\"  No results found for: \"APTT\"  Medication Documentation Review Audit       Reviewed by Gayla Gallegos MA (Medical Assistant) on 02/19/25 at 1301      Medication Order Taking? Sig Documenting Provider Last Dose Status   aspirin 81 mg EC tablet [id number] Yes Take 1 tablet (81 mg) by mouth once daily. Historical Provider, MD  Active   atorvastatin (Lipitor) 40 mg tablet [id number] Yes TAKE 1 TABLET BY MOUTH EVERYDAY AT BEDTIME Wilder Jones, DO  Active   finasteride (Propecia) 1 mg tablet [id number] Yes Take 1 tablet (1 mg) by mouth once daily. Do not crush, chew, or split. Wilder Jones, DO  Active   hydroCHLOROthiazide (Microzide) 12.5 mg tablet [id number] Yes TAKE 1 TABLET BY MOUTH EVERY DAY Wilder Jones, DO  Active   hydroxyurea (Hydrea) 500 mg capsule [id number] Yes TAKE TWO (2) CAPSULES BY MOUTH ONCE A DAY ON MONDAY, WEDNESDAY, AND FRIDAY AND TAKE ONE (1) CAPSULE BY MOUTH ONCE A DAY THE REMAINING 4 DAYS EACH WEEK Jesus Alberto Rose MD  Active   losartan (Cozaar) 50 mg tablet [id number] Yes TAKE 1 TABLET BY MOUTH EVERY DAY Wilder Jones,   Active   meloxicam (Mobic) 15 mg tablet [id number] Yes TAKE 1 TABLET BY MOUTH EVERY DAY AS NEEDED FOR PAIN Wilder Jones, " DO  Active   tadalafil (Cialis) 10 mg tablet [id number] Yes TAKE 1 TABLET (10 MG) BY MOUTH ONCE DAILY AS NEEDED FOR ERECTILE DYSFUNCTION. Wilder Jones, DO  Active   topiramate (Topamax) 25 mg tablet [id number] Yes Take 2 tablets (50 mg) by mouth once daily at bedtime. 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, and started on hydrea 500 mg daily  -11/2020 hydrea titrated to 1000 mg x 3 days, 500 mg x 4 days a week  -doing well, has no complaints  -says he is bored, has little to do at home, trying to find some part time work  -labs done on 2/13/2025 included CBC + COMP  -results reviewed--wbc 12.8, hgb 15.8, hematocrit 45.7%,  plt 361,000, ANC 6690, creatinine 1.51, alk phos 75, AST 17, ALT 22  -advised Edward to maintain hydrea as is, 1000 mg x 3 days, 500 mg x 4 days  -advised him to continue with daily baby ASA  -will see him again in 6 months     2) hypertension  -on hydrochlorothiazide  -on losartan     3) osteoarthritis  -on mobic PRN     4) hyperlipidemia  -on atorvastatin        Problem List Items Addressed This Visit             ICD-10-CM    Essential thrombocythemia (Multi) D47.3    Relevant Orders    Clinic Appointment Request Follow Up; JESUS ALBERTO ROSE; [facility]    Comprehensive metabolic panel    CBC and Auto Differential            Jesus Alberto Rose MD

## 2025-03-17 DIAGNOSIS — I10 ESSENTIAL (PRIMARY) HYPERTENSION: ICD-10-CM

## 2025-03-17 RX ORDER — LOSARTAN POTASSIUM 50 MG/1
50 TABLET ORAL DAILY
Qty: 90 TABLET | Refills: 2 | Status: SHIPPED | OUTPATIENT
Start: 2025-03-17

## 2025-03-30 DIAGNOSIS — M19.90 ARTHRITIS: ICD-10-CM

## 2025-03-31 RX ORDER — MELOXICAM 15 MG/1
15 TABLET ORAL DAILY PRN
Qty: 30 TABLET | Refills: 0 | Status: SHIPPED | OUTPATIENT
Start: 2025-03-31

## 2025-04-27 DIAGNOSIS — M19.90 ARTHRITIS: ICD-10-CM

## 2025-04-28 RX ORDER — MELOXICAM 15 MG/1
15 TABLET ORAL DAILY PRN
Qty: 30 TABLET | Refills: 3 | Status: SHIPPED | OUTPATIENT
Start: 2025-04-28

## 2025-06-19 DIAGNOSIS — D45 POLYCYTHEMIA VERA: ICD-10-CM

## 2025-06-19 RX ORDER — HYDROXYUREA 500 MG/1
CAPSULE ORAL
Qty: 42 CAPSULE | Refills: 5 | Status: SHIPPED | OUTPATIENT
Start: 2025-06-19 | End: 2026-06-19

## 2025-06-19 NOTE — PROGRESS NOTES
[facility] Refill Management:    Medication(s) Requested: hydroxyurea 1000 mg (2 x 500 mg) PO once daily on Mon, Weds, Fri (3 days/week) and 500 mg PO once daily on Sun, Tues, Thurs, Sat (other 4 days/week)    Date of Request: 06/19/25 5:47 PM    - Labs in last 3 months: No -- pt stable, labs on 2/13/25  - Office visit in last 3 months: No -- pt stable, last office visit on 2/19/25  - Changes in medications in last 3 months: No  - Actionable labs or dose adjustments requiring physician clarification: No    Next FUV scheduled for 8/19/25 w/ Jesus Alberto Rose MD    Approved for refill under Consult Agreement: Yes    Comments: Continue current hydroxyurea dosing and schedule.    Jacob Blackwell, RP, MS, BCOP  Clinical Pharmacy Specialist - Ambulatory Oncology

## 2025-07-18 RX ORDER — TOPIRAMATE 25 MG/1
50 TABLET, FILM COATED ORAL NIGHTLY
Qty: 60 TABLET | Refills: 5 | Status: SHIPPED | OUTPATIENT
Start: 2025-07-18 | End: 2026-01-14

## 2025-08-18 ENCOUNTER — LAB (OUTPATIENT)
Dept: LAB | Facility: CLINIC | Age: 70
End: 2025-08-18
Payer: MEDICARE

## 2025-08-19 ENCOUNTER — OFFICE VISIT (OUTPATIENT)
Dept: HEMATOLOGY/ONCOLOGY | Facility: CLINIC | Age: 70
End: 2025-08-19
Payer: MEDICARE

## 2025-08-19 ASSESSMENT — PAIN SCALES - GENERAL: PAINLEVEL_OUTOF10: 0-NO PAIN

## 2025-09-01 ASSESSMENT — ENCOUNTER SYMPTOMS
MUSCULOSKELETAL NEGATIVE: 1
PSYCHIATRIC NEGATIVE: 1
ENDOCRINE NEGATIVE: 1
NEUROLOGICAL NEGATIVE: 1
CONSTITUTIONAL NEGATIVE: 1
CARDIOVASCULAR NEGATIVE: 1
EYES NEGATIVE: 1
RESPIRATORY NEGATIVE: 1
HEMATOLOGIC/LYMPHATIC NEGATIVE: 1
GASTROINTESTINAL NEGATIVE: 1

## 2025-09-12 ENCOUNTER — APPOINTMENT (OUTPATIENT)
Dept: PRIMARY CARE | Facility: CLINIC | Age: 70
End: 2025-09-12
Payer: COMMERCIAL